# Patient Record
Sex: FEMALE | Race: WHITE | Employment: UNEMPLOYED | URBAN - METROPOLITAN AREA
[De-identification: names, ages, dates, MRNs, and addresses within clinical notes are randomized per-mention and may not be internally consistent; named-entity substitution may affect disease eponyms.]

---

## 2020-12-20 ENCOUNTER — APPOINTMENT (OUTPATIENT)
Dept: GENERAL RADIOLOGY | Age: 50
End: 2020-12-20
Payer: OTHER GOVERNMENT

## 2020-12-20 ENCOUNTER — HOSPITAL ENCOUNTER (EMERGENCY)
Age: 50
Discharge: HOME | End: 2020-12-20
Attending: EMERGENCY MEDICINE
Payer: OTHER GOVERNMENT

## 2020-12-20 VITALS
HEART RATE: 88 BPM | RESPIRATION RATE: 20 BRPM | BODY MASS INDEX: 28.12 KG/M2 | WEIGHT: 175 LBS | OXYGEN SATURATION: 98 % | SYSTOLIC BLOOD PRESSURE: 147 MMHG | DIASTOLIC BLOOD PRESSURE: 89 MMHG | HEIGHT: 66 IN | TEMPERATURE: 98.3 F

## 2020-12-20 LAB
ANION GAP SERPL CALCULATED.3IONS-SCNC: 9 MMOL/L (ref 3–16)
BASOPHILS ABSOLUTE: 0 K/UL (ref 0–0.2)
BASOPHILS RELATIVE PERCENT: 0.6 %
BUN BLDV-MCNC: 17 MG/DL (ref 7–20)
CALCIUM SERPL-MCNC: 10 MG/DL (ref 8.3–10.6)
CHLORIDE BLD-SCNC: 102 MMOL/L (ref 99–110)
CO2: 26 MMOL/L (ref 21–32)
CREAT SERPL-MCNC: 0.7 MG/DL (ref 0.6–1.1)
EOSINOPHILS ABSOLUTE: 0 K/UL (ref 0–0.6)
EOSINOPHILS RELATIVE PERCENT: 0.8 %
GFR AFRICAN AMERICAN: >60
GFR NON-AFRICAN AMERICAN: >60
GLUCOSE BLD-MCNC: 97 MG/DL (ref 70–99)
HCT VFR BLD CALC: 42.4 % (ref 36–48)
HEMOGLOBIN: 14.2 G/DL (ref 12–16)
LYMPHOCYTES ABSOLUTE: 1.6 K/UL (ref 1–5.1)
LYMPHOCYTES RELATIVE PERCENT: 28 %
MCH RBC QN AUTO: 33.5 PG (ref 26–34)
MCHC RBC AUTO-ENTMCNC: 33.4 G/DL (ref 31–36)
MCV RBC AUTO: 100.1 FL (ref 80–100)
MONOCYTES ABSOLUTE: 0.6 K/UL (ref 0–1.3)
MONOCYTES RELATIVE PERCENT: 9.6 %
NEUTROPHILS ABSOLUTE: 3.5 K/UL (ref 1.7–7.7)
NEUTROPHILS RELATIVE PERCENT: 61 %
PDW BLD-RTO: 13.6 % (ref 12.4–15.4)
PLATELET # BLD: 212 K/UL (ref 135–450)
PMV BLD AUTO: 7.1 FL (ref 5–10.5)
POTASSIUM REFLEX MAGNESIUM: 4.5 MMOL/L (ref 3.5–5.1)
RBC # BLD: 4.23 M/UL (ref 4–5.2)
SODIUM BLD-SCNC: 137 MMOL/L (ref 136–145)
WBC # BLD: 5.7 K/UL (ref 4–11)

## 2020-12-20 PROCEDURE — 80048 BASIC METABOLIC PNL TOTAL CA: CPT

## 2020-12-20 PROCEDURE — 71045 X-RAY EXAM CHEST 1 VIEW: CPT

## 2020-12-20 PROCEDURE — 85025 COMPLETE CBC W/AUTO DIFF WBC: CPT

## 2020-12-20 PROCEDURE — 36415 COLL VENOUS BLD VENIPUNCTURE: CPT

## 2020-12-20 PROCEDURE — 99283 EMERGENCY DEPT VISIT LOW MDM: CPT

## 2020-12-20 RX ORDER — BENZONATATE 100 MG/1
100 CAPSULE ORAL 3 TIMES DAILY PRN
Qty: 30 CAPSULE | Refills: 0 | Status: SHIPPED | OUTPATIENT
Start: 2020-12-20 | End: 2020-12-30

## 2020-12-20 ASSESSMENT — ENCOUNTER SYMPTOMS
COUGH: 1
NAUSEA: 0
SHORTNESS OF BREATH: 1
WHEEZING: 0
VOMITING: 0
ABDOMINAL DISTENTION: 0
ABDOMINAL PAIN: 0
DIARRHEA: 0

## 2020-12-20 NOTE — ED PROVIDER NOTES
ED Attending Attestation Note     Date of evaluation: 12/20/2020    This patient was seen by the resident, Dr. Toby Payne. I have seen and examined the patient, agree with the workup, evaluation, management and diagnosis. The care plan has been discussed. This is a 80-year-old female with a past medical history of right ear deafness as well as COPD that presents today for evaluation of generalized weakness as well as cough. Patient was diagnosed with COVID-19 on 12/3/2020. She states that she warranting for 14 days as well as was given prescriptions for doxycycline and steroids. She states that her symptoms improved slightly, but her main symptom is a nonproductive cough. She states that she has episodes where she feels warm but does not have any overt fevers. She states she continues to take in plenty of p.o. Denies chest pain. She denies abdominal pain or nausea/vomiting/diarrhea. She is able to ambulate. She states she has not been to work because of the overall weakness. On exam, she is nontoxic-appearing. She is not hypoxic on room air. Her bilateral TMs are visualized with no effusion or erythema. Her posterior pharynx is visualized without exudates or erythema. Her mucous membranes are moist.  She has a regular rate and rhythm, lung sounds are clear and equal bilaterally in all lung fields. Her abdomen soft, nontender, nondistended. She has no edema in the lower extremities, and no calf tenderness. Work-up in the emergency department consisted of labs and a chest x-ray. Patient has no leukocytosis or left shift. Chest x-ray, 1 view, as reviewed by me, shows viral pneumonia. At this point, the patient has no acute needs for hospitalization and can be safely discharged home. Be encouraged to continue hydration, Tylenol as needed for joint pain, and vitamin supplements. We will also discharge her home with a pulse oximeter to monitor her oxygen saturation.     Please see resident note for reassessments and final disposition.              Katia Nathan MD  12/20/20 [id number]

## 2020-12-20 NOTE — ED PROVIDER NOTES
[facility] RESIDENT NOTE       Date of evaluation: 12/20/2020    Chief Complaint     Positive For Covid-19 and Dizziness      History of Present Illness     Luiza Prakash is a 48 y.o. female who presents with continued cough and fatigue related to covid 19 positive test about 2 weeks ago. Patient was given steroids and doxycycline by outside facility for covid and felt better with steroids but after she finished course began to feel more fatigued. She continues to have a dry cough that is improving. She has mild diarrhea. Has dyspnea on exertion though pulse ox 97% after ambulation in ED. Denies any f/c, cp, n/v.    Review of Systems     Review of Systems   Constitutional: Positive for fatigue. Negative for chills and fever. HENT: Negative for congestion. Respiratory: Positive for cough and shortness of breath. Negative for wheezing. Cardiovascular: Negative for chest pain and palpitations. Gastrointestinal: Negative for abdominal distention, abdominal pain, diarrhea, nausea and vomiting. Genitourinary: Negative for dysuria. Neurological: Negative for dizziness, syncope, weakness, light-headedness and numbness. Past Medical, Surgical, Family, and Social History     She has no past medical history on file. She has no past surgical history on file. Her family history is not on file. She reports that she has been smoking. She has been smoking about 0.50 packs per day. She has never used smokeless tobacco. She reports that she does not drink alcohol or use drugs. Medications     Discharge Medication List as of 12/20/2020  3:05 PM          Allergies     She has No Known Allergies. Physical Exam     INITIAL VITALS: BP: (!) 155/92, Temp: 98.3 °F (36.8 °C), Pulse: 93, Resp: 20, SpO2: 98 %    Physical Exam  Constitutional:       General: She is not in acute distress. Appearance: She is well-developed. She is not diaphoretic. HENT:      Head: Normocephalic and atraumatic. Cardiovascular:      Rate and Rhythm: Normal rate and regular rhythm. Heart sounds: Normal heart sounds. No murmur. Pulmonary:      Effort: Pulmonary effort is normal. No respiratory distress. Breath sounds: Normal breath sounds. No wheezing. Abdominal:      General: Bowel sounds are normal. There is no distension. Palpations: Abdomen is soft. Tenderness: There is no abdominal tenderness. Skin:     General: Skin is warm and dry. Capillary Refill: Capillary refill takes less than 2 seconds. Findings: No erythema. Neurological:      Mental Status: She is alert and oriented to person, place, and time.    Psychiatric:         Behavior: Behavior normal.         Diagnostic Results       RADIOLOGY:  XR CHEST PORTABLE   Final Result      Mild bilateral ground glass opacities as described          LABS:   Results for orders placed or performed during the hospital encounter of 12/20/20   CBC Auto Differential   Result Value Ref Range    WBC 5.7 4.0 - 11.0 K/uL    RBC 4.23 4.00 - 5.20 M/uL    Hemoglobin 14.2 12.0 - 16.0 g/dL    Hematocrit 42.4 36.0 - 48.0 %    .1 (H) 80.0 - 100.0 fL    MCH 33.5 26.0 - 34.0 pg    MCHC 33.4 31.0 - 36.0 g/dL    RDW 13.6 12.4 - 15.4 %    Platelets 156 892 - 317 K/uL    MPV 7.1 5.0 - 10.5 fL    Neutrophils % 61.0 %    Lymphocytes % 28.0 %    Monocytes % 9.6 %    Eosinophils % 0.8 %    Basophils % 0.6 %    Neutrophils Absolute 3.5 1.7 - 7.7 K/uL    Lymphocytes Absolute 1.6 1.0 - 5.1 K/uL    Monocytes Absolute 0.6 0.0 - 1.3 K/uL    Eosinophils Absolute 0.0 0.0 - 0.6 K/uL    Basophils Absolute 0.0 0.0 - 0.2 K/uL   Basic Metabolic Panel w/ Reflex to MG   Result Value Ref Range    Sodium 137 136 - 145 mmol/L    Potassium reflex Magnesium 4.5 3.5 - 5.1 mmol/L    Chloride 102 99 - 110 mmol/L    CO2 26 21 - 32 mmol/L    Anion Gap 9 3 - 16    Glucose 97 70 - 99 mg/dL    BUN 17 7 - 20 mg/dL    CREATININE 0.7 0.6 - 1.1 mg/dL    GFR Non-African American >60 >60    GFR African American >60 >60    Calcium 10.0 8.3 - 10.6 mg/dL         RECENT VITALS:  BP: (!) 147/89, Temp: 98.3 °F (36.8 °C),Pulse: 88, Resp: 20, SpO2: 98 %     Procedures     n/a    ED Course     Nursing Notes, Past Medical Hx, Past Surgical Hx, Social Hx, Allergies, and FamilyHx were reviewed. The patient was giventhe following medications:  Orders Placed This Encounter   Medications    benzonatate (TESSALON PERLES) 100 MG capsule     Sig: Take 1 capsule by mouth 3 times daily as needed for Cough     Dispense:  30 capsule     Refill:  0       CONSULTS:  None    MEDICAL DECISION MAKING / ASSESSMENT / Shyam Sharmila is a 48 y.o. female with known covid positive test presents with continued fatigue and cough after 2 weeks. Likely from extended healing for covid pna. Will check CBC, BMP, and CXR to r/o any other abnormalities. Will discharge patient with instructions to take vit D, Vit C, and Zn. Will give tessalon pearls to help with cough as mucinex gives mild diarrhea. CXR with some ground-glass opacities, consistent with covid pna. Labs unremarkable. Patient medically stable for discharge. This patient was also evaluated by the attending physician. All care plans were discussed and agreed upon.     Clinical Impression     1. COVID-19        Disposition     PATIENT REFERRED TO:  Alysa Pimentel MD  [address] [address] [phone]    In 2 weeks  Hospial admission follow-up      DISCHARGE MEDICATIONS:  Discharge Medication List as of 12/20/2020  3:05 PM      START taking these medications    Details   benzonatate (TESSALON PERLES) 100 MG capsule Take 1 capsule by mouth 3 times daily as needed for Cough, Disp-30 capsule, R-[id number]             DISPOSITION Decision To Discharge 12/20/2020 02:43:49 PM       Suad Brown MD  Resident  12/20/20 [id number]

## 2020-12-21 ENCOUNTER — CARE COORDINATION (OUTPATIENT)
Dept: CARE COORDINATION | Age: 50
End: 2020-12-21

## 2020-12-21 NOTE — CARE COORDINATION
First phone call placed to reach patient for ED FU. Her phone message said that phone is restricted or unavailable. Plan  Make second phone call    Josselin Gonzalez.  Jessica El RN, BSN, [address] [place] [address] Primary Care  [phone]

## 2020-12-22 ENCOUNTER — CARE COORDINATION (OUTPATIENT)
Dept: CARE COORDINATION | Age: 50
End: 2020-12-22

## 2021-05-22 ENCOUNTER — APPOINTMENT (OUTPATIENT)
Dept: CT IMAGING | Age: 51
DRG: 194 | End: 2021-05-22
Payer: COMMERCIAL

## 2021-05-22 ENCOUNTER — APPOINTMENT (OUTPATIENT)
Dept: GENERAL RADIOLOGY | Age: 51
DRG: 194 | End: 2021-05-22
Payer: COMMERCIAL

## 2021-05-22 ENCOUNTER — HOSPITAL ENCOUNTER (INPATIENT)
Age: 51
LOS: 5 days | Discharge: HOME | DRG: 194 | End: 2021-05-27
Attending: STUDENT IN AN ORGANIZED HEALTH CARE EDUCATION/TRAINING PROGRAM | Admitting: INTERNAL MEDICINE
Payer: COMMERCIAL

## 2021-05-22 DIAGNOSIS — J18.9 PNEUMONIA DUE TO ORGANISM: Primary | ICD-10-CM

## 2021-05-22 PROBLEM — J96.01 ACUTE RESPIRATORY FAILURE WITH HYPOXIA (HCC): Status: ACTIVE | Noted: 2021-05-22

## 2021-05-22 LAB
ANION GAP SERPL CALCULATED.3IONS-SCNC: 9 MMOL/L (ref 3–16)
BASOPHILS ABSOLUTE: 0 K/UL (ref 0–0.2)
BASOPHILS RELATIVE PERCENT: 0.7 %
BUN BLDV-MCNC: 12 MG/DL (ref 7–20)
CALCIUM SERPL-MCNC: 9.5 MG/DL (ref 8.3–10.6)
CHLORIDE BLD-SCNC: 103 MMOL/L (ref 99–110)
CO2: 27 MMOL/L (ref 21–32)
CREAT SERPL-MCNC: 0.8 MG/DL (ref 0.6–1.1)
EOSINOPHILS ABSOLUTE: 0 K/UL (ref 0–0.6)
EOSINOPHILS RELATIVE PERCENT: 0.4 %
GFR AFRICAN AMERICAN: >60
GFR NON-AFRICAN AMERICAN: >60
GLUCOSE BLD-MCNC: 109 MG/DL (ref 70–99)
HCT VFR BLD CALC: 42.8 % (ref 36–48)
HEMOGLOBIN: 14.7 G/DL (ref 12–16)
LYMPHOCYTES ABSOLUTE: 0.9 K/UL (ref 1–5.1)
LYMPHOCYTES RELATIVE PERCENT: 20.3 %
MCH RBC QN AUTO: 33.7 PG (ref 26–34)
MCHC RBC AUTO-ENTMCNC: 34.3 G/DL (ref 31–36)
MCV RBC AUTO: 98.3 FL (ref 80–100)
MONOCYTES ABSOLUTE: 0.3 K/UL (ref 0–1.3)
MONOCYTES RELATIVE PERCENT: 7.8 %
NEUTROPHILS ABSOLUTE: 3.1 K/UL (ref 1.7–7.7)
NEUTROPHILS RELATIVE PERCENT: 70.8 %
PDW BLD-RTO: 13.3 % (ref 12.4–15.4)
PLATELET # BLD: 183 K/UL (ref 135–450)
PMV BLD AUTO: 6.8 FL (ref 5–10.5)
POTASSIUM REFLEX MAGNESIUM: 4 MMOL/L (ref 3.5–5.1)
PRO-BNP: 22 PG/ML (ref 0–124)
RBC # BLD: 4.36 M/UL (ref 4–5.2)
SODIUM BLD-SCNC: 139 MMOL/L (ref 136–145)
WBC # BLD: 4.3 K/UL (ref 4–11)

## 2021-05-22 PROCEDURE — U0005 INFEC AGEN DETEC AMPLI PROBE: HCPCS

## 2021-05-22 PROCEDURE — 6370000000 HC RX 637 (ALT 250 FOR IP): Performed by: STUDENT IN AN ORGANIZED HEALTH CARE EDUCATION/TRAINING PROGRAM

## 2021-05-22 PROCEDURE — 85025 COMPLETE CBC W/AUTO DIFF WBC: CPT

## 2021-05-22 PROCEDURE — 71260 CT THORAX DX C+: CPT

## 2021-05-22 PROCEDURE — 71046 X-RAY EXAM CHEST 2 VIEWS: CPT

## 2021-05-22 PROCEDURE — 99285 EMERGENCY DEPT VISIT HI MDM: CPT

## 2021-05-22 PROCEDURE — U0003 INFECTIOUS AGENT DETECTION BY NUCLEIC ACID (DNA OR RNA); SEVERE ACUTE RESPIRATORY SYNDROME CORONAVIRUS 2 (SARS-COV-2) (CORONAVIRUS DISEASE [COVID-19]), AMPLIFIED PROBE TECHNIQUE, MAKING USE OF HIGH THROUGHPUT TECHNOLOGIES AS DESCRIBED BY CMS-2020-01-R: HCPCS

## 2021-05-22 PROCEDURE — 2580000003 HC RX 258: Performed by: STUDENT IN AN ORGANIZED HEALTH CARE EDUCATION/TRAINING PROGRAM

## 2021-05-22 PROCEDURE — 94640 AIRWAY INHALATION TREATMENT: CPT

## 2021-05-22 PROCEDURE — 80048 BASIC METABOLIC PNL TOTAL CA: CPT

## 2021-05-22 PROCEDURE — 6360000004 HC RX CONTRAST MEDICATION: Performed by: STUDENT IN AN ORGANIZED HEALTH CARE EDUCATION/TRAINING PROGRAM

## 2021-05-22 PROCEDURE — 6360000002 HC RX W HCPCS: Performed by: STUDENT IN AN ORGANIZED HEALTH CARE EDUCATION/TRAINING PROGRAM

## 2021-05-22 PROCEDURE — 1200000000 HC SEMI PRIVATE

## 2021-05-22 PROCEDURE — 93005 ELECTROCARDIOGRAM TRACING: CPT | Performed by: STUDENT IN AN ORGANIZED HEALTH CARE EDUCATION/TRAINING PROGRAM

## 2021-05-22 PROCEDURE — 96365 THER/PROPH/DIAG IV INF INIT: CPT

## 2021-05-22 PROCEDURE — 94664 DEMO&/EVAL PT USE INHALER: CPT

## 2021-05-22 PROCEDURE — 83880 ASSAY OF NATRIURETIC PEPTIDE: CPT

## 2021-05-22 RX ORDER — ALBUTEROL SULFATE 2.5 MG/3ML
2.5 SOLUTION RESPIRATORY (INHALATION) ONCE
Status: DISCONTINUED | OUTPATIENT
Start: 2021-05-22 | End: 2021-05-23

## 2021-05-22 RX ORDER — IPRATROPIUM BROMIDE AND ALBUTEROL SULFATE 2.5; .5 MG/3ML; MG/3ML
1 SOLUTION RESPIRATORY (INHALATION) ONCE
Status: COMPLETED | OUTPATIENT
Start: 2021-05-22 | End: 2021-05-22

## 2021-05-22 RX ORDER — ALBUTEROL SULFATE 2.5 MG/3ML
2.5 SOLUTION RESPIRATORY (INHALATION) ONCE
Status: COMPLETED | OUTPATIENT
Start: 2021-05-22 | End: 2021-05-22

## 2021-05-22 RX ORDER — BENZONATATE 100 MG/1
100 CAPSULE ORAL 3 TIMES DAILY PRN
Qty: 30 CAPSULE | Refills: 0 | Status: SHIPPED | OUTPATIENT
Start: 2021-05-22 | End: 2021-05-27 | Stop reason: SDUPTHER

## 2021-05-22 RX ORDER — ALBUTEROL SULFATE 90 UG/1
2 AEROSOL, METERED RESPIRATORY (INHALATION) EVERY 6 HOURS PRN
Status: DISCONTINUED | OUTPATIENT
Start: 2021-05-22 | End: 2021-05-22

## 2021-05-22 RX ORDER — ALBUTEROL SULFATE 90 UG/1
2 AEROSOL, METERED RESPIRATORY (INHALATION) 4 TIMES DAILY PRN
Qty: 1 INHALER | Refills: 1 | Status: SHIPPED | OUTPATIENT
Start: 2021-05-22 | End: 2021-05-27 | Stop reason: SDUPTHER

## 2021-05-22 RX ORDER — PREDNISONE 20 MG/1
40 TABLET ORAL ONCE
Status: COMPLETED | OUTPATIENT
Start: 2021-05-22 | End: 2021-05-22

## 2021-05-22 RX ADMIN — ALBUTEROL SULFATE 2.5 MG: 2.5 SOLUTION RESPIRATORY (INHALATION) at 22:56

## 2021-05-22 RX ADMIN — IPRATROPIUM BROMIDE AND ALBUTEROL SULFATE 1 AMPULE: .5; 2.5 SOLUTION RESPIRATORY (INHALATION) at 18:31

## 2021-05-22 RX ADMIN — IPRATROPIUM BROMIDE AND ALBUTEROL SULFATE 1 AMPULE: .5; 2.5 SOLUTION RESPIRATORY (INHALATION) at 19:35

## 2021-05-22 RX ADMIN — IOPAMIDOL 80 ML: 755 INJECTION, SOLUTION INTRAVENOUS at 22:17

## 2021-05-22 RX ADMIN — AZITHROMYCIN MONOHYDRATE 500 MG: 500 INJECTION, POWDER, LYOPHILIZED, FOR SOLUTION INTRAVENOUS at 23:57

## 2021-05-22 RX ADMIN — DEXTROSE MONOHYDRATE 1000 MG: 5 INJECTION INTRAVENOUS at 23:13

## 2021-05-22 RX ADMIN — PREDNISONE 40 MG: 20 TABLET ORAL at 19:30

## 2021-05-22 ASSESSMENT — PAIN DESCRIPTION - PAIN TYPE: TYPE: ACUTE PAIN

## 2021-05-22 ASSESSMENT — ENCOUNTER SYMPTOMS
RHINORRHEA: 0
SHORTNESS OF BREATH: 1
NAUSEA: 0
BLOOD IN STOOL: 0
VOMITING: 0
ABDOMINAL PAIN: 0
SORE THROAT: 0
CHEST TIGHTNESS: 0
CONSTIPATION: 0
COUGH: 1
DIARRHEA: 0

## 2021-05-22 ASSESSMENT — PAIN SCALES - GENERAL: PAINLEVEL_OUTOF10: 4

## 2021-05-22 ASSESSMENT — PAIN DESCRIPTION - LOCATION: LOCATION: CHEST

## 2021-05-22 NOTE — ED PROVIDER NOTES
[facility] RESIDENT NOTE       Date of evaluation: 5/22/2021    Chief Complaint     Cough (since Wednesday, missed appt for OP CXR on Thursday, thinks she has PNA)      History of Present Illness     Gianfranco Cummings is a 46 y.o. female with cough. The patient reports she has had a cough since 3 days prior to presentation which has been intermittently productive, without hemoptysis. She has been coughing so frequently she is now having bilateral lower rib pain and is having trouble sleeping. She has had no ill contacts. She has no orthopnea, weight gain, lower extremity edema. She does have chest pain in the context of coughing, but no exertional pain, diaphoresis, dyspnea on exertion. She states that she is concerned she has pneumonia she had similar symptoms in the fall and was treated for pneumonia with oral antibiotics. She smokes tobacco daily, does not use illicit substances. Review of Systems     Review of Systems   Constitutional: Positive for fatigue and fever. Negative for chills and unexpected weight change. HENT: Negative for rhinorrhea and sore throat. Eyes: Negative for visual disturbance. Respiratory: Positive for cough and shortness of breath. Negative for chest tightness. Cardiovascular: Negative for chest pain and leg swelling. Gastrointestinal: Negative for abdominal pain, blood in stool, constipation, diarrhea, nausea and vomiting. Genitourinary: Negative for dysuria. Skin: Negative for rash. Neurological: Negative for dizziness, weakness and headaches. Physical Exam     INITIAL VITALS: BP: (!) 156/92, Temp: 99 °F (37.2 °C), Pulse: 115, Resp: 15, SpO2: 94 %   General: Comfortable appearing woman, coughing intermittently  HEENT:  Normocephalic, atraumatic. PERRL. Conjunctivae pink, moist. No scleral icterus. No nasal discharge. Moist mucus membranes. Neck:  Supple. Trachea midline.  JVP estimated at 5 cmH2O.  Pulmonary:   Normal respiratory effort. Intermittent bibasilar wheeze, no rhonchi. Cardiac: Regular rate and rhythm. No murmurs, gallops or rubs. Radial pulses 2+ bilaterally. Capillary refill < 2 seconds in fingers. Abdomen:  Soft, not tender, not distended. Bowel sounds present. Musculoskeletal:  Normal bulk and tone for age. Skin:  Warm, dry. No rashes, ecchymosis or cyanosis. Neuro: Alert and oriented x 4. Cranial nerves grossly intact. Moving all extremities equally. Gait normal.  Extremities:  No peripheral edema. Psych: Euthymic affect. Normal rate and rhythm of speech with full prosody. Linear thought process. ED Course     Nursing Notes, Past Medical Hx, Past Surgical Hx, Social Hx, Allergies, and Family Hx were reviewed. The patient was given the following medications:  Orders Placed This Encounter   Medications    ipratropium-albuterol (DUONEB) nebulizer solution 1 ampule    benzonatate (TESSALON PERLES) 100 MG capsule     Sig: Take 1 capsule by mouth 3 times daily as needed for Cough     Dispense:  30 capsule     Refill:  0    albuterol sulfate HFA (VENTOLIN HFA) 108 (90 Base) MCG/ACT inhaler     Sig: Inhale 2 puffs into the lungs 4 times daily as needed for Wheezing     Dispense:  1 Inhaler     Refill:  1       CONSULTS:  None    MEDICAL DECISION MAKING / ASSESSMENT / Cornelius Yosef is a 46 y.o. female who presented to the emergency department with Cough (since Wednesday, missed appt for OP CXR on Thursday, thinks she has PNA)   with a history and presentation as described above in HPI. The patient was evaluated by myself and the ED attending physician. All management and disposition plans were discussed and agreed upon. ED Course as of May 22 1851   Sat May 22, 2021   0405 The patient presents with likely bronchitis although there is some concern for pneumonia given her fever at home. She does not have obviously focal exam findings initially.   She does smoke, putting her at risk for lung disease in general.  I have a low suspicion for heart failure based on her initial evaluation including the absence of JVP elevation or significant lower extremity edema. She was initially tachycardic in triage, which is likely in the context of her frequent coughing fits. [CS]   1596 Labs are notable for a negative BNP, clear chest x-ray, no significant metabolic abnormalities. On reassessment after DuoNeb's the patient felt significant improved. She was discharged with Tessalon as well as albuterol for symptom control. I recommended primary care follow-up, we discussed return precaution including severe worsening of her symptoms, trouble breathing, presyncope, hemoptysis, or other new worrisome symptoms. [CS]      ED Course User Index  [CS] Dustin Burks MD         This patient was also evaluated by the attending physician. All care plans were discussed and agreed upon. Clinical Impression     1. Bronchitis        Disposition     At this time the patient has been deemed safe for discharge. My customary discharge instructions including strict return precautions for worsening or new symptoms have been communicated. The patient was able to have all questions answered and was in agreement with the stated plan.       PATIENT REFERRED TO:  José Miguel Marrero MD  [address] [address]  [phone]    Schedule an appointment as soon as possible for a visit   As needed, If symptoms worsen    [facility] Emergency Department  [address] [place] [address]  Go to   As needed, If symptoms worsen      DISCHARGE MEDICATIONS:  New Prescriptions    ALBUTEROL SULFATE HFA (VENTOLIN HFA) 108 (90 BASE) MCG/ACT INHALER    Inhale 2 puffs into the lungs 4 times daily as needed for Wheezing    BENZONATATE (TESSALON PERLES) 100 MG CAPSULE    Take 1 capsule by mouth 3 times daily as needed for Cough       DISPOSITION        Diagnostic Results     EKG   Interpreted in conjunction with emergency department physician Sandra Don MD  Normal sinus rhythm with a rate of 97 bpm, sinus arrhythmia is present. Normal axis and intervals. There are no ST deflections. RADIOLOGY:  XR CHEST (2 VW)   Final Result   1. No acute disease. LABS:   Results for orders placed or performed during the hospital encounter of 05/22/21   CBC Auto Differential   Result Value Ref Range    WBC 4.3 4.0 - 11.0 K/uL    RBC 4.36 4.00 - 5.20 M/uL    Hemoglobin 14.7 12.0 - 16.0 g/dL    Hematocrit 42.8 36.0 - 48.0 %    MCV 98.3 80.0 - 100.0 fL    MCH 33.7 26.0 - 34.0 pg    MCHC 34.3 31.0 - 36.0 g/dL    RDW 13.3 12.4 - 15.4 %    Platelets 704 589 - 197 K/uL    MPV 6.8 5.0 - 10.5 fL    Neutrophils % 70.8 %    Lymphocytes % 20.3 %    Monocytes % 7.8 %    Eosinophils % 0.4 %    Basophils % 0.7 %    Neutrophils Absolute 3.1 1.7 - 7.7 K/uL    Lymphocytes Absolute 0.9 (L) 1.0 - 5.1 K/uL    Monocytes Absolute 0.3 0.0 - 1.3 K/uL    Eosinophils Absolute 0.0 0.0 - 0.6 K/uL    Basophils Absolute 0.0 0.0 - 0.2 K/uL   Brain Natriuretic Peptide   Result Value Ref Range    Pro-BNP 22 0 - 124 pg/mL   Basic Metabolic Panel w/ Reflex to MG   Result Value Ref Range    Sodium 139 136 - 145 mmol/L    Potassium reflex Magnesium 4.0 3.5 - 5.1 mmol/L    Chloride 103 99 - 110 mmol/L    CO2 27 21 - 32 mmol/L    Anion Gap 9 3 - 16    Glucose 109 (H) 70 - 99 mg/dL    BUN 12 7 - 20 mg/dL    CREATININE 0.8 0.6 - 1.1 mg/dL    GFR Non-African American >60 >60    GFR African American >60 >60    Calcium 9.5 8.3 - 10.6 mg/dL       ED BEDSIDE ULTRASOUND:  None     RECENT VITALS:  BP: (!) 150/94, Temp: 99 °F (37.2 °C), Pulse: 99, Resp: 15, SpO2: 94 %     Procedures     None     Past Medical, Surgical, Family, and Social History     She has no past medical history on file. She has no past surgical history on file. Her family history is not on file. She reports that she has been smoking.  She has been smoking about 0.50 packs per day. She has never used smokeless tobacco. She reports that she does not drink alcohol and does not use drugs. Medications     Previous Medications    No medications on file       Allergies     She has No Known Allergies.         Pete Gray MD  Resident  05/22/21 6092

## 2021-05-22 NOTE — ED NOTES
PIV placed using aseptic technique per hospital policy. Blood collected and sent to lab.      Shana Mcclellan RN  05/22/21 7212

## 2021-05-22 NOTE — ED PROVIDER NOTES
ED Note  Addendum    Isabell Canas is a 46 y.o. female patient who presented to the emergency department. This patient was initially seen by Dr. Airam Wilhelm, an off-going provider. Please see that provider's note for details regarding the initial history, physical exam and ED course. In brief, this is a 46 y.o. female who presented with cough and SOB. Care of this patient was signed out to me. At the time of turnover the following steps in the patient's evaluation were pending: Breathing treatments, reassessment and disposition. On my assessment the patient was awake, alert and in no acute distress. Patient was satting in the high 80s to low 90s on 2 L of oxygen, thus she was increased to 3 L of oxygen and improved to the mid 90s. Patient was treated with additional breathing treatments as well as 40 mg of prednisone. Given that the patient continued to require oxygen without a previously known history of COPD or asthma, CTPA was ordered to evaluate for pulmonary embolism as well as an occult pneumonia. Covid test was sent. Patient continued to require oxygen despite breathing treatment. CTPA showed no evidence of pulmonary embolism but did show multifocal pneumonia. Patient was treated with IV ceftriaxone and azithromycin. Case was discussed with medicine who accepted patient for admission for further evaluation and management. At this time the patient has been admitted to medicine for further evaluation and management. All results were discussed with the patient at length, concerns were addressed and all questions answered. Risks, benefits, and alternatives were discussed with the patient, and she agrees on this disposition. The patient will continue to be monitored here in the emergency department until which time she is moved to her new treatment location.      Summary of Treatment in ED:  Medications   albuterol (PROVENTIL) nebulizer solution 2.5 mg ( Nebulization Canceled Entry 5/22/21 1930) cefTRIAXone (ROCEPHIN) 1000 mg IVPB in 50 mL D5W minibag (1,000 mg Intravenous New Bag 5/22/21 2313)   azithromycin (ZITHROMAX) 500 mg in dextrose 5 % 250 mL IVPB (has no administration in time range)   ipratropium-albuterol (DUONEB) nebulizer solution 1 ampule (1 ampule Inhalation Given 5/22/21 1831)   predniSONE (DELTASONE) tablet 40 mg (40 mg Oral Given 5/22/21 1930)   ipratropium-albuterol (DUONEB) nebulizer solution 1 ampule (1 ampule Inhalation Given 5/22/21 1935)   iopamidol (ISOVUE-370) 76 % injection 80 mL (80 mLs Intravenous Given 5/22/21 2217)   albuterol (PROVENTIL) nebulizer solution 2.5 mg (2.5 mg Nebulization Given 5/22/21 2256)       Impression     1. Pneumonia due to organism         Plan   Admit to medicine     1. The patient is to be admitted in stable condition. 2. Workup, treatment and diagnosis were discussed with the patient and/or family members; the patient agrees to the plan and all questions were addressed and answered.     Girma Sanabria MD, MD, [address] [place] [address] MD  Resident  05/22/21 7852

## 2021-05-22 NOTE — ED NOTES
Patient complains of cough since Wednesday. Hx of Covid-19. Placed on 2L of oxygen for 88% oxygen saturation.      Salvador Wolf RN  05/22/21 4540

## 2021-05-23 LAB
ANION GAP SERPL CALCULATED.3IONS-SCNC: 9 MMOL/L (ref 3–16)
BUN BLDV-MCNC: 14 MG/DL (ref 7–20)
CALCIUM SERPL-MCNC: 9.5 MG/DL (ref 8.3–10.6)
CHLORIDE BLD-SCNC: 101 MMOL/L (ref 99–110)
CO2: 28 MMOL/L (ref 21–32)
CREAT SERPL-MCNC: 0.8 MG/DL (ref 0.6–1.1)
EKG ATRIAL RATE: 97 BPM
EKG DIAGNOSIS: NORMAL
EKG P AXIS: 64 DEGREES
EKG P-R INTERVAL: 134 MS
EKG Q-T INTERVAL: 322 MS
EKG QRS DURATION: 62 MS
EKG QTC CALCULATION (BAZETT): 408 MS
EKG R AXIS: 58 DEGREES
EKG T AXIS: 63 DEGREES
EKG VENTRICULAR RATE: 97 BPM
GFR AFRICAN AMERICAN: >60
GFR NON-AFRICAN AMERICAN: >60
GLUCOSE BLD-MCNC: 109 MG/DL (ref 70–99)
GLUCOSE BLD-MCNC: 110 MG/DL (ref 70–99)
GLUCOSE BLD-MCNC: 167 MG/DL (ref 70–99)
MAGNESIUM: 2.1 MG/DL (ref 1.8–2.4)
PERFORMED ON: ABNORMAL
PERFORMED ON: ABNORMAL
POTASSIUM SERPL-SCNC: 4.5 MMOL/L (ref 3.5–5.1)
PROCALCITONIN: 0.1 NG/ML (ref 0–0.15)
RAPID INFLUENZA  B AGN: NEGATIVE
RAPID INFLUENZA A AGN: NEGATIVE
RSV SOURCE: NORMAL
SARS-COV-2, PCR: NOT DETECTED
SODIUM BLD-SCNC: 138 MMOL/L (ref 136–145)

## 2021-05-23 PROCEDURE — 94664 DEMO&/EVAL PT USE INHALER: CPT

## 2021-05-23 PROCEDURE — 6370000000 HC RX 637 (ALT 250 FOR IP): Performed by: INTERNAL MEDICINE

## 2021-05-23 PROCEDURE — 2580000003 HC RX 258: Performed by: STUDENT IN AN ORGANIZED HEALTH CARE EDUCATION/TRAINING PROGRAM

## 2021-05-23 PROCEDURE — 2700000000 HC OXYGEN THERAPY PER DAY

## 2021-05-23 PROCEDURE — 94761 N-INVAS EAR/PLS OXIMETRY MLT: CPT

## 2021-05-23 PROCEDURE — 6370000000 HC RX 637 (ALT 250 FOR IP): Performed by: STUDENT IN AN ORGANIZED HEALTH CARE EDUCATION/TRAINING PROGRAM

## 2021-05-23 PROCEDURE — 2060000000 HC ICU INTERMEDIATE R&B

## 2021-05-23 PROCEDURE — 80048 BASIC METABOLIC PNL TOTAL CA: CPT

## 2021-05-23 PROCEDURE — 94640 AIRWAY INHALATION TREATMENT: CPT

## 2021-05-23 PROCEDURE — 87449 NOS EACH ORGANISM AG IA: CPT

## 2021-05-23 PROCEDURE — 87804 INFLUENZA ASSAY W/OPTIC: CPT

## 2021-05-23 PROCEDURE — 94669 MECHANICAL CHEST WALL OSCILL: CPT

## 2021-05-23 PROCEDURE — 83735 ASSAY OF MAGNESIUM: CPT

## 2021-05-23 PROCEDURE — 84145 PROCALCITONIN (PCT): CPT

## 2021-05-23 PROCEDURE — 36415 COLL VENOUS BLD VENIPUNCTURE: CPT

## 2021-05-23 PROCEDURE — 6360000002 HC RX W HCPCS: Performed by: STUDENT IN AN ORGANIZED HEALTH CARE EDUCATION/TRAINING PROGRAM

## 2021-05-23 RX ORDER — ACETAMINOPHEN 650 MG/1
650 SUPPOSITORY RECTAL EVERY 6 HOURS PRN
Status: DISCONTINUED | OUTPATIENT
Start: 2021-05-23 | End: 2021-05-27 | Stop reason: HOSPADM

## 2021-05-23 RX ORDER — ALBUTEROL SULFATE 2.5 MG/3ML
2.5 SOLUTION RESPIRATORY (INHALATION) EVERY 4 HOURS PRN
Status: DISCONTINUED | OUTPATIENT
Start: 2021-05-23 | End: 2021-05-27 | Stop reason: HOSPADM

## 2021-05-23 RX ORDER — POLYETHYLENE GLYCOL 3350 17 G/17G
17 POWDER, FOR SOLUTION ORAL DAILY PRN
Status: DISCONTINUED | OUTPATIENT
Start: 2021-05-23 | End: 2021-05-27 | Stop reason: HOSPADM

## 2021-05-23 RX ORDER — SODIUM CHLORIDE 9 MG/ML
25 INJECTION, SOLUTION INTRAVENOUS PRN
Status: DISCONTINUED | OUTPATIENT
Start: 2021-05-23 | End: 2021-05-27 | Stop reason: HOSPADM

## 2021-05-23 RX ORDER — ONDANSETRON 2 MG/ML
4 INJECTION INTRAMUSCULAR; INTRAVENOUS EVERY 6 HOURS PRN
Status: DISCONTINUED | OUTPATIENT
Start: 2021-05-23 | End: 2021-05-27 | Stop reason: HOSPADM

## 2021-05-23 RX ORDER — FAMOTIDINE 20 MG/1
20 TABLET, FILM COATED ORAL 2 TIMES DAILY
Status: DISCONTINUED | OUTPATIENT
Start: 2021-05-23 | End: 2021-05-27 | Stop reason: HOSPADM

## 2021-05-23 RX ORDER — PREDNISONE 20 MG/1
40 TABLET ORAL DAILY
Status: COMPLETED | OUTPATIENT
Start: 2021-05-23 | End: 2021-05-26

## 2021-05-23 RX ORDER — PROMETHAZINE HYDROCHLORIDE 25 MG/1
12.5 TABLET ORAL EVERY 6 HOURS PRN
Status: DISCONTINUED | OUTPATIENT
Start: 2021-05-23 | End: 2021-05-27 | Stop reason: HOSPADM

## 2021-05-23 RX ORDER — IPRATROPIUM BROMIDE AND ALBUTEROL SULFATE 2.5; .5 MG/3ML; MG/3ML
1 SOLUTION RESPIRATORY (INHALATION) EVERY 4 HOURS PRN
Status: DISCONTINUED | OUTPATIENT
Start: 2021-05-23 | End: 2021-05-23

## 2021-05-23 RX ORDER — NICOTINE 21 MG/24HR
1 PATCH, TRANSDERMAL 24 HOURS TRANSDERMAL DAILY
Status: DISCONTINUED | OUTPATIENT
Start: 2021-05-23 | End: 2021-05-27 | Stop reason: HOSPADM

## 2021-05-23 RX ORDER — ACETAMINOPHEN 325 MG/1
650 TABLET ORAL EVERY 6 HOURS PRN
Status: DISCONTINUED | OUTPATIENT
Start: 2021-05-23 | End: 2021-05-27 | Stop reason: HOSPADM

## 2021-05-23 RX ORDER — AZITHROMYCIN 250 MG/1
250 TABLET, FILM COATED ORAL DAILY
Status: DISCONTINUED | OUTPATIENT
Start: 2021-05-23 | End: 2021-05-24

## 2021-05-23 RX ORDER — SODIUM CHLORIDE 0.9 % (FLUSH) 0.9 %
5-40 SYRINGE (ML) INJECTION PRN
Status: DISCONTINUED | OUTPATIENT
Start: 2021-05-23 | End: 2021-05-27 | Stop reason: HOSPADM

## 2021-05-23 RX ORDER — IPRATROPIUM BROMIDE AND ALBUTEROL SULFATE 2.5; .5 MG/3ML; MG/3ML
1 SOLUTION RESPIRATORY (INHALATION)
Status: DISCONTINUED | OUTPATIENT
Start: 2021-05-23 | End: 2021-05-23

## 2021-05-23 RX ORDER — ALBUTEROL SULFATE 2.5 MG/3ML
2.5 SOLUTION RESPIRATORY (INHALATION)
Status: DISCONTINUED | OUTPATIENT
Start: 2021-05-23 | End: 2021-05-23

## 2021-05-23 RX ORDER — SODIUM CHLORIDE 0.9 % (FLUSH) 0.9 %
5-40 SYRINGE (ML) INJECTION EVERY 12 HOURS SCHEDULED
Status: DISCONTINUED | OUTPATIENT
Start: 2021-05-23 | End: 2021-05-27 | Stop reason: HOSPADM

## 2021-05-23 RX ADMIN — PREDNISONE 40 MG: 20 TABLET ORAL at 08:50

## 2021-05-23 RX ADMIN — FAMOTIDINE 20 MG: 20 TABLET ORAL at 08:50

## 2021-05-23 RX ADMIN — CEFTRIAXONE 1000 MG: 1 INJECTION, POWDER, FOR SOLUTION INTRAMUSCULAR; INTRAVENOUS at 20:50

## 2021-05-23 RX ADMIN — FAMOTIDINE 20 MG: 20 TABLET ORAL at 20:50

## 2021-05-23 RX ADMIN — Medication 10 ML: at 21:01

## 2021-05-23 RX ADMIN — IPRATROPIUM BROMIDE AND ALBUTEROL 1 PUFF: 20; 100 SPRAY, METERED RESPIRATORY (INHALATION) at 11:54

## 2021-05-23 RX ADMIN — IPRATROPIUM BROMIDE AND ALBUTEROL SULFATE 1 AMPULE: .5; 3 SOLUTION RESPIRATORY (INHALATION) at 08:05

## 2021-05-23 RX ADMIN — AZITHROMYCIN MONOHYDRATE 250 MG: 250 TABLET ORAL at 08:50

## 2021-05-23 RX ADMIN — IPRATROPIUM BROMIDE AND ALBUTEROL 1 PUFF: 20; 100 SPRAY, METERED RESPIRATORY (INHALATION) at 15:37

## 2021-05-23 RX ADMIN — IPRATROPIUM BROMIDE AND ALBUTEROL 1 PUFF: 20; 100 SPRAY, METERED RESPIRATORY (INHALATION) at 20:32

## 2021-05-23 RX ADMIN — ACETAMINOPHEN 650 MG: 325 TABLET ORAL at 16:44

## 2021-05-23 RX ADMIN — IPRATROPIUM BROMIDE AND ALBUTEROL 1 PUFF: 20; 100 SPRAY, METERED RESPIRATORY (INHALATION) at 16:39

## 2021-05-23 ASSESSMENT — ENCOUNTER SYMPTOMS
PHOTOPHOBIA: 0
COUGH: 1
STRIDOR: 0
ABDOMINAL DISTENTION: 0
CHEST TIGHTNESS: 1
TROUBLE SWALLOWING: 0
ABDOMINAL PAIN: 0
VOICE CHANGE: 0
APNEA: 0
CHOKING: 0
SHORTNESS OF BREATH: 1
CONSTIPATION: 0
NAUSEA: 0
DIARRHEA: 0
WHEEZING: 1
BACK PAIN: 0
COLOR CHANGE: 0
SINUS PAIN: 0
EYE PAIN: 0
VOMITING: 0

## 2021-05-23 ASSESSMENT — VISUAL ACUITY: OU: 1

## 2021-05-23 ASSESSMENT — PAIN SCALES - GENERAL
PAINLEVEL_OUTOF10: 0
PAINLEVEL_OUTOF10: 4
PAINLEVEL_OUTOF10: 0

## 2021-05-23 NOTE — PROGRESS NOTES
RESPIRATORY THERAPY ASSESSMENT    Name:  Hemant Record Number:  [id number]  Age: 46 y.o. Gender: female  : 1970  Today's Date:  2021  Room:  [address] [place] [address]-    Assessment     Is the patient being admitted for a COPD or Asthma exacerbation? No   (If yes the patient will be seen every 4 hours for the first 24 hours and then reassessed)    Patient Admission Diagnosis      Allergies           Pulmonary History: Current Smoker  Home Oxygen Therapy:  room air  Home Respiratory Therapy:Albuterol   Current Respiratory Therapy:  DuoNebs Q4wa & HHN Albuteral prn  Treatment Type: HHN  Medications: Albuterol/Ipratropium    Respiratory Severity Index(RSI)   Patients with orders for inhalation medications, oxygen, or any therapeutic treatment modality will be placed on Respiratory Protocol. They will be assessed with the first treatment and at least every 72 hours thereafter. The following severity scale will be used to determine frequency of treatment intervention. Smoking History: Pulmonary Disease or Smoking History, Greater than 15 pack year = 2    Social History  Social History     Tobacco Use    Smoking status: Current Every Day Smoker     Packs/day: 0.50    Smokeless tobacco: Never Used   Vaping Use    Vaping Use: Never used   Substance Use Topics    Alcohol use: No    Drug use: No       Recent Surgical History: None = 0  Past Surgical History  History reviewed. No pertinent surgical history.     Level of Consciousness: Alert, Oriented, and Cooperative = 0    Level of Activity: Walking with assistance = 1    Respiratory Pattern: Regular Pattern; RR 8-20 = 0    Breath Sounds: Absent bilaterally and/or with wheezes = 3    Sputum   ,  ,    Cough: Strong, productive = 1    Vital Signs   /81   Pulse 108   Temp 97.3 °F (36.3 °C) (Oral)   Resp 20   Ht 5' 6\" (1.676 m)   Wt 190 lb (86.2 kg)   SpO2 96%   BMI 30.67 kg/m²   SPO2 (COPD values may differ): 86-87% on room air or greater than 92% on FiO2 35- 50% = 3    Peak Flow (asthma only): not applicable = 0    RSI: 90-48 = Q4WA (every four hours while awake) and Q4hrs PRN        Plan       Goals: medication delivery, mobilize retained secretions, volume expansion and improve oxygenation    Patient/caregiver was educated on the proper method of use for Respiratory Care Devices:  Yes      Level of patient/caregiver understanding able to:   ? Verbalize understanding   ? Demonstrate understanding       ? Teach back        ? Needs reinforcement       ? No available caregiver               ? Other:     Response to education:  Good     Is patient being placed on Home Treatment Regimen? No     Does the patient have everything they need prior to discharge? NA     Comments: Pt Admitted Pt admitted in ED with SOB    Plan of Care: Continue Duoneb Q4wa    Electronically signed by Duke Hall RCP on 5/23/2021 at 2:27 AM    Respiratory Protocol Guidelines     1. Assessment and treatment by Respiratory Therapy will be initiated for medication and therapeutic interventions upon initiation of aerosolized medication. 2. Physician will be contacted for respiratory rate (RR) greater than 35 breaths per minute. Therapy will be held for heart rate (HR) greater than 140 beats per minute, pending direction from physician. 3. Bronchodilators will be administered via Metered Dose Inhaler (MDI) with spacer when the following criteria are met:  a. Alert and cooperative     b. HR < 140 bpm  c. RR < 30 bpm                d. Can demonstrate a 2-3 second inspiratory hold  4. Bronchodilators will be administered via Hand Held Nebulizer [facility]) to patients when ANY of the following criteria are met  a. Incognizant or uncooperative          b. Patients treated with HHN at Home        c. Unable to demonstrate proper use of MDI with spacer     d. RR > 30 bpm   5.  Bronchodilators will be delivered via Metered Dose Inhaler (MDI), HHN, Aerogen to intubated patients on mechanical ventilation. 6. Inhalation medication orders will be delivered and/or substituted as outlined below. Aerosolized Medications Ordering and Administration Guidelines:    1. All Medications will be ordered by a physician, and their frequency and/or modality will be adjusted as defined by the patients Respiratory Severity Index (RSI) score. 2. If the patient does not have documented COPD, consider discontinuing anticholinergics when RSI is less than 9.  3. If the bronchospasm worsens (increased RSI), then the bronchodilator frequency can be increased to a maximum of every 4 hours. If greater than every 4 hours is required, the physician will be contacted. 4. If the bronchospasm improves, the frequency of the bronchodilator can be decreased, based on the patient's RSI, but not less than home treatment regimen frequency. 5. Bronchodilator(s) will be discontinued if patient has a RSI less than 9 and has received no scheduled or as needed treatment for 72  Hrs. Patients Ordered on a Mucolytic Agent:    1. Must always be administered with a bronchodilator. 2. Discontinue if patient experiences worsened bronchospasm, or secretions have lessened to the point that the patient is able to clear them with a cough. Anti-inflammatory and Combination Medications:    1. If the patient lacks prior history of lung disease, is not using inhaled anti-inflammatory medication at home, and lacks wheezing by examination or by history for at least 24 hours, contact physician for possible discontinuation.

## 2021-05-23 NOTE — PROGRESS NOTES
Internal Medicine Progress Note  Patient Name: Sergio De Santiago   Patient : 1970   Date: 2021   Admit Date: 2021     CC: Cough    Interval history: No overnight events. Patient has been hypertensive (/85). SPO2 94% on 4L. No leukocytosis (WBC 4.3). She continues to have some dyspnea with cough, although states that this is improved somewhat compared to admission. Admits urinary incontinence associated with coughing. No other acute complaints at this time. Review of Systems   Constitutional: Negative for fatigue and fever. HENT: Negative for ear pain and sinus pain. Eyes: Negative for pain. Respiratory: Positive for cough and shortness of breath. Cardiovascular: Negative for chest pain. Gastrointestinal: Negative for abdominal pain, constipation and diarrhea. Endocrine: Negative for polyuria. Genitourinary: Negative for flank pain and pelvic pain. Incontinence with coughing   Musculoskeletal: Negative for back pain. Skin: Negative for color change. Neurological: Negative for dizziness and headaches. Psychiatric/Behavioral: Negative for agitation, behavioral problems and confusion. Physical Exam:  Patient Vitals for the past 8 hrs:   BP Temp Temp src Pulse Resp SpO2   21 1245 (!) 151/85 97.7 °F (36.5 °C) Oral 84 18 94 %   21 1155 -- -- -- -- -- 97 %   21 0843 (!) 127/93 97.1 °F (36.2 °C) Oral 81 18 97 %   21 0805 -- -- -- -- -- 94 %     Physical Exam  Constitutional:       General: She is awake. She is not in acute distress. Appearance: Normal appearance. HENT:      Head: Normocephalic and atraumatic. Nose: Nose normal.   Eyes:      General: Vision grossly intact. Gaze aligned appropriately. Right eye: No discharge. Left eye: No discharge. Extraocular Movements: Extraocular movements intact.       Conjunctiva/sclera: Conjunctivae normal.   Cardiovascular:      Rate and Rhythm: Normal rate and Radiology:  CT CHEST PULMONARY EMBOLISM W CONTRAST   Final Result      1. No evidence of pulmonary thromboembolism. No acute vascular abnormality. 2. Patchy areas of branching micronodular opacities bilaterally involving all lobes suggesting bronchiolitis versus early bilateral multifocal bronchopneumonia. XR CHEST (2 VW)   Final Result   1. No acute disease. Assessment and Plan:    51F w/ PMH smoking, Covid-positive test (asymptomatic) who presented 05/22 w/ dyspnea and fever. 1. CAP  - CXR (05/22/21): No acute disease.  - CT-chest (05/22/21): Patchy micronodular opacities c/w bronchiolitis vs. bronchopneumonia. - WBC 4.3, procal 0.10 at admission.  - Plan: CTX 1 g QD. Azithromycin 250 mg QD. 2. COPD exacerbation  - She has a 20 pack-year smoking history.  - No home oxygen requirement. - Plan: Prednisone 40 mg QD. Supplemental oxygen as needed. Combivent (KATHY/EUGENIO) q4h.     3. GERD  - Pepcid 20 mg QD. 4. Smoking  - Nicotine patch.  - Encourage cessation. 5. DVT PPX  - SCDs.  - Encourage ambulation. 6. Diet  - General.    7. Disposition  - Except d/c to home when O2 requirement improved. Will discuss with attending physician MD Adriane Kebede DO, PhD  Internal Medicine Resident (PGY-2)  [facility] [place]  [address] [address]

## 2021-05-23 NOTE — H&P
Internal Medicine  PGY2  History & Physical      CC   Chief Complaint   Patient presents with    Cough     since Wednesday, missed appt for OP CXR on Thursday, thinks she has PNA         History Obtained From:  patient, electronic medical record    HISTORY OF PRESENT ILLNESS:  The patient is a 45 yo female smoker with no known prior history who presented to the ED with 4 days of SOB, fatigue, subjective fever. She reports that symptoms started on Wednesday. She was feeling very tired, increasing productive cough with clear yellow sputum associated with SOB and subjective fever. She had a virtual appointment with her PCP on the next day and was advised to get a CXR for her cough. However, today she felt worse and decided to come to the hospital.     She denies any CP but endorses chest tightness after each bouts of coughing. No sick contact, no recent travel, no N/V, HA, diarrhea. Of note, she did have positive COVID 19 test in 12/2020 when she has minimal symptoms with SOB and dry cough but was in close contact with her sister who had positive COVID 19. During that time she was not required to be hospitalized or on oxygen for COVID 19 infection. She was free of symptoms until now. She does reports that she is a current smoker with 20 pack year history. She has not been smoking the past few days because of cough and shortness of breath. In the ED, she was found to be mildly hypoxic and was placed on 1L of oxygen satting in the upper 90s. Other labs were unremarkable. CTPA in the ED ruled out PE but does shows multifocal bilateral opacities concerning for pneumonia. She was given Azithromycin, Rocephin, 1 dose of steroid, and breathing treatment. Past Medical History:    History reviewed. No pertinent past medical history. Past Surgical History:    History reviewed. No pertinent surgical history. Medications Priorto Admission:    No medications prior to admission.     Allergies:  Patient has no known normal. No respiratory distress. Breath sounds: Wheezing present. No rales. Chest:      Chest wall: No tenderness. Abdominal:      General: Bowel sounds are normal. There is no distension. Palpations: Abdomen is soft. Tenderness: There is no abdominal tenderness. Musculoskeletal:         General: No swelling, tenderness or deformity. Normal range of motion. Cervical back: Normal range of motion and neck supple. Skin:     General: Skin is warm and dry. Neurological:      General: No focal deficit present. Mental Status: She is alert and oriented to person, place, and time. Cranial Nerves: No cranial nerve deficit. Sensory: No sensory deficit. Psychiatric:         Behavior: Behavior normal.       Physical exam:       Vitals:    05/23/21 0040   BP: 135/81   Pulse: 108   Resp: 20   Temp: 97.3 °F (36.3 °C)   SpO2: 96%       DATA:    Labs:  CBC:   Recent Labs     05/22/21  1750   WBC 4.3   HGB 14.7   HCT 42.8          BMP:   Recent Labs     05/22/21  1750      K 4.0      CO2 27   BUN 12   CREATININE 0.8   GLUCOSE 109*     LFT's: No results for input(s): AST, ALT, ALB, BILITOT, ALKPHOS in the last 72 hours. Troponin: No results for input(s): TROPONINI in the last 72 hours. BNP:No results for input(s): BNP in the last 72 hours. ABGs: No results for input(s): PHART, GYG6ZVN, PO2ART in the last 72 hours. INR: No results for input(s): INR in the last 72 hours. U/A:No results for input(s): NITRITE, COLORU, PHUR, LABCAST, WBCUA, RBCUA, MUCUS, TRICHOMONAS, YEAST, BACTERIA, CLARITYU, SPECGRAV, LEUKOCYTESUR, UROBILINOGEN, BILIRUBINUR, BLOODU, GLUCOSEU, AMORPHOUS in the last 72 hours. Invalid input(s): KETONESU    CT CHEST PULMONARY EMBOLISM W CONTRAST   Final Result      1. No evidence of pulmonary thromboembolism. No acute vascular abnormality.    2. Patchy areas of branching micronodular opacities bilaterally involving all lobes suggesting bronchiolitis versus early bilateral multifocal bronchopneumonia. XR CHEST (2 VW)   Final Result   1. No acute disease. ASSESSMENT AND PLAN:  46year old female smoker with no know past medical history presented with worsening SOB, productive cough, fever and admitted for the concern of PNA and exacerbation of underlying obstructive disease. Acute hypoxic respiratory failure   Most likely due to multifocal pneumonia and exacerbation of underlying suspected obstructive lung disease (?COPD vs Asthma)  Patient has 20 pack year history of smoking, but does not have a previous history of COPD but does present with increase cough, increase sputum production very consistent with an acute exacerbation of possible underlying obstructive lung disease. Breathing treatments   Bronchodilators   Prednisone x 5 days   Azithromycine x 5 days   Suspicious for bacterial multifocal pneumonia, will continue Rocephin  Procal pending   Respiratory panel, and Strep ag, legionella pending   Rapid flu negative     Multifocal pneumonia   Management with Abx as above     Suspected COPD exacerbation   Given her long history of smoking and now presented with productive cough and has expiratory wheezes. Management as above  Counseled on smoking cessation    History of COVID 19   COVID 19 positive in 12/2020 but with minimal symptoms including SOB and dry cough. Did not have to be hospitalized or on oxygen. Sx resolved after several days.    COVID 19 test resent in ED   Given her multifocal pneumonia findings, will place on droplet plus precaution until COVID rule out    Tobacco use   History of 20 pack year smoking history   Current smoking   Nicotine patch   Counseled on smoking cessation     Obesity   BMI of 30.6  Counseled patient on weight loss       Will discuss with attending physician Dr. Paul Danielle Status:Full code  FEN: Regular diet   PPX: SCDs, ambulation, no indication for chemical ppx  DISPO: Frederic Lagos MD,

## 2021-05-23 NOTE — PROGRESS NOTES
4 Eyes Admission Assessment     I agree as the admission nurse that 2 RN's have performed a thorough Head to Toe Skin Assessment on the patient. ALL assessment sites listed below have been assessed on admission. Areas assessed by both nurses:   [x]   Head, Face, and Ears   [x]   Shoulders, Back, and Chest  [x]   Arms, Elbows, and Hands   [x]   Coccyx, Sacrum, and Ischium  [x]   Legs, Feet, and Heels        Does the Patient have Skin Breakdown?   No         Hair Prevention initiated:  NA   Wound Care Orders initiated:  NA      WOC nurse consulted for Pressure Injury (Stage 3,4, Unstageable, DTI, NWPT, and Complex wounds) or Hair score 18 or lower:  NA      Nurse 1 eSignature: Electronically signed by Cayden Hogue RN on 5/23/21 at 7:15 AM EDT    **SHARE this note so that the co-signing nurse is able to place an eSignature**    Nurse 2 eSignature: Electronically signed by Sariah Mcdonough RN on 5/23/21 at 7:19 AM EDT

## 2021-05-23 NOTE — ED PROVIDER NOTES
ED Attending Attestation Note     Date of evaluation: 5/22/2021    This patient was seen by the resident. I have seen and examined the patient, agree with the workup, evaluation, management and diagnosis. The care plan has been discussed. I have reviewed the ECG and concur with the resident's interpretation. My assessment reveals 45 y/o F who presents for cough and shortness of breath. Gradually worsening over the last few days and associated with fatigue. Complains of lower rib pain worse with coughing. Tachycardic 110s, afebrile. SpO2 low 90s on arrival.  Bilateral wheezing. Not in davin distress. CXR negative for acute process. Labs without gross abnormality. Given duonebs with improvement of wheezing, but on recheck patient was persistently hypoxic with a good waveform 87% on room air. Improved to low/mid 90s on 2 L NC oxygen. CT PE study ordered. Anticipate admission for persistent hypoxia. COVID swab pending.      Satnam Chan MD  05/22/21 [id number]

## 2021-05-24 ENCOUNTER — APPOINTMENT (OUTPATIENT)
Dept: GENERAL RADIOLOGY | Age: 51
DRG: 194 | End: 2021-05-24
Payer: COMMERCIAL

## 2021-05-24 LAB
ANION GAP SERPL CALCULATED.3IONS-SCNC: 10 MMOL/L (ref 3–16)
BASOPHILS ABSOLUTE: 0.2 K/UL (ref 0–0.2)
BASOPHILS RELATIVE PERCENT: 3.3 %
BUN BLDV-MCNC: 13 MG/DL (ref 7–20)
CALCIUM SERPL-MCNC: 9.6 MG/DL (ref 8.3–10.6)
CHLORIDE BLD-SCNC: 102 MMOL/L (ref 99–110)
CO2: 27 MMOL/L (ref 21–32)
CREAT SERPL-MCNC: 0.7 MG/DL (ref 0.6–1.1)
EOSINOPHILS ABSOLUTE: 0 K/UL (ref 0–0.6)
EOSINOPHILS RELATIVE PERCENT: 0.1 %
GFR AFRICAN AMERICAN: >60
GFR NON-AFRICAN AMERICAN: >60
GLUCOSE BLD-MCNC: 106 MG/DL (ref 70–99)
HCT VFR BLD CALC: 42.1 % (ref 36–48)
HEMOGLOBIN: 14.5 G/DL (ref 12–16)
L. PNEUMOPHILA SEROGP 1 UR AG: NORMAL
LYMPHOCYTES ABSOLUTE: 1.8 K/UL (ref 1–5.1)
LYMPHOCYTES RELATIVE PERCENT: 32.9 %
MAGNESIUM: 2.1 MG/DL (ref 1.8–2.4)
MCH RBC QN AUTO: 33.9 PG (ref 26–34)
MCHC RBC AUTO-ENTMCNC: 34.4 G/DL (ref 31–36)
MCV RBC AUTO: 98.5 FL (ref 80–100)
MONOCYTES ABSOLUTE: 0.3 K/UL (ref 0–1.3)
MONOCYTES RELATIVE PERCENT: 6 %
NEUTROPHILS ABSOLUTE: 3.2 K/UL (ref 1.7–7.7)
NEUTROPHILS RELATIVE PERCENT: 57.7 %
PDW BLD-RTO: 13.5 % (ref 12.4–15.4)
PLATELET # BLD: 199 K/UL (ref 135–450)
PMV BLD AUTO: 7.1 FL (ref 5–10.5)
POTASSIUM SERPL-SCNC: 3.8 MMOL/L (ref 3.5–5.1)
RBC # BLD: 4.27 M/UL (ref 4–5.2)
SODIUM BLD-SCNC: 139 MMOL/L (ref 136–145)
STREP PNEUMONIAE ANTIGEN, URINE: NORMAL
WBC # BLD: 5.6 K/UL (ref 4–11)

## 2021-05-24 PROCEDURE — 87205 SMEAR GRAM STAIN: CPT

## 2021-05-24 PROCEDURE — 2580000003 HC RX 258: Performed by: STUDENT IN AN ORGANIZED HEALTH CARE EDUCATION/TRAINING PROGRAM

## 2021-05-24 PROCEDURE — 6360000002 HC RX W HCPCS: Performed by: STUDENT IN AN ORGANIZED HEALTH CARE EDUCATION/TRAINING PROGRAM

## 2021-05-24 PROCEDURE — 99223 1ST HOSP IP/OBS HIGH 75: CPT | Performed by: INTERNAL MEDICINE

## 2021-05-24 PROCEDURE — 83735 ASSAY OF MAGNESIUM: CPT

## 2021-05-24 PROCEDURE — 94640 AIRWAY INHALATION TREATMENT: CPT

## 2021-05-24 PROCEDURE — 2060000000 HC ICU INTERMEDIATE R&B

## 2021-05-24 PROCEDURE — 6370000000 HC RX 637 (ALT 250 FOR IP): Performed by: STUDENT IN AN ORGANIZED HEALTH CARE EDUCATION/TRAINING PROGRAM

## 2021-05-24 PROCEDURE — 94761 N-INVAS EAR/PLS OXIMETRY MLT: CPT

## 2021-05-24 PROCEDURE — 36415 COLL VENOUS BLD VENIPUNCTURE: CPT

## 2021-05-24 PROCEDURE — 84443 ASSAY THYROID STIM HORMONE: CPT

## 2021-05-24 PROCEDURE — 6370000000 HC RX 637 (ALT 250 FOR IP): Performed by: INTERNAL MEDICINE

## 2021-05-24 PROCEDURE — 6360000002 HC RX W HCPCS: Performed by: INTERNAL MEDICINE

## 2021-05-24 PROCEDURE — 71046 X-RAY EXAM CHEST 2 VIEWS: CPT

## 2021-05-24 PROCEDURE — 80048 BASIC METABOLIC PNL TOTAL CA: CPT

## 2021-05-24 PROCEDURE — 87070 CULTURE OTHR SPECIMN AEROBIC: CPT

## 2021-05-24 PROCEDURE — 85025 COMPLETE CBC W/AUTO DIFF WBC: CPT

## 2021-05-24 RX ORDER — IPRATROPIUM BROMIDE AND ALBUTEROL SULFATE 2.5; .5 MG/3ML; MG/3ML
1 SOLUTION RESPIRATORY (INHALATION)
Status: DISCONTINUED | OUTPATIENT
Start: 2021-05-24 | End: 2021-05-27 | Stop reason: HOSPADM

## 2021-05-24 RX ORDER — BENZONATATE 100 MG/1
100 CAPSULE ORAL 3 TIMES DAILY PRN
Status: DISCONTINUED | OUTPATIENT
Start: 2021-05-24 | End: 2021-05-27 | Stop reason: HOSPADM

## 2021-05-24 RX ORDER — ARFORMOTEROL TARTRATE 15 UG/2ML
15 SOLUTION RESPIRATORY (INHALATION) 2 TIMES DAILY
Status: DISCONTINUED | OUTPATIENT
Start: 2021-05-24 | End: 2021-05-27 | Stop reason: HOSPADM

## 2021-05-24 RX ORDER — GUAIFENESIN 600 MG/1
600 TABLET, EXTENDED RELEASE ORAL 2 TIMES DAILY
Status: DISCONTINUED | OUTPATIENT
Start: 2021-05-24 | End: 2021-05-26

## 2021-05-24 RX ORDER — AZITHROMYCIN 250 MG/1
250 TABLET, FILM COATED ORAL DAILY
Status: DISCONTINUED | OUTPATIENT
Start: 2021-05-25 | End: 2021-05-25

## 2021-05-24 RX ORDER — BUDESONIDE 0.5 MG/2ML
0.5 INHALANT ORAL 2 TIMES DAILY
Status: DISCONTINUED | OUTPATIENT
Start: 2021-05-24 | End: 2021-05-27 | Stop reason: HOSPADM

## 2021-05-24 RX ADMIN — IPRATROPIUM BROMIDE AND ALBUTEROL SULFATE 1 AMPULE: .5; 2.5 SOLUTION RESPIRATORY (INHALATION) at 11:14

## 2021-05-24 RX ADMIN — CEFTRIAXONE 1000 MG: 1 INJECTION, POWDER, FOR SOLUTION INTRAMUSCULAR; INTRAVENOUS at 16:47

## 2021-05-24 RX ADMIN — ACETAMINOPHEN 650 MG: 325 TABLET ORAL at 13:26

## 2021-05-24 RX ADMIN — GUAIFENESIN 600 MG: 600 TABLET, EXTENDED RELEASE ORAL at 20:28

## 2021-05-24 RX ADMIN — ARFORMOTEROL TARTRATE 15 MCG: 15 SOLUTION RESPIRATORY (INHALATION) at 19:50

## 2021-05-24 RX ADMIN — BENZONATATE 100 MG: 100 CAPSULE ORAL at 05:47

## 2021-05-24 RX ADMIN — Medication 10 ML: at 20:30

## 2021-05-24 RX ADMIN — ENOXAPARIN SODIUM 40 MG: 40 INJECTION SUBCUTANEOUS at 12:02

## 2021-05-24 RX ADMIN — IPRATROPIUM BROMIDE AND ALBUTEROL SULFATE 1 AMPULE: .5; 2.5 SOLUTION RESPIRATORY (INHALATION) at 15:07

## 2021-05-24 RX ADMIN — FAMOTIDINE 20 MG: 20 TABLET ORAL at 09:15

## 2021-05-24 RX ADMIN — FAMOTIDINE 20 MG: 20 TABLET ORAL at 20:28

## 2021-05-24 RX ADMIN — BENZONATATE 100 MG: 100 CAPSULE ORAL at 20:27

## 2021-05-24 RX ADMIN — GUAIFENESIN 600 MG: 600 TABLET, EXTENDED RELEASE ORAL at 13:23

## 2021-05-24 RX ADMIN — Medication 10 ML: at 10:25

## 2021-05-24 RX ADMIN — PREDNISONE 40 MG: 20 TABLET ORAL at 09:15

## 2021-05-24 RX ADMIN — ACETAMINOPHEN 650 MG: 325 TABLET ORAL at 05:16

## 2021-05-24 RX ADMIN — IPRATROPIUM BROMIDE AND ALBUTEROL 1 PUFF: 20; 100 SPRAY, METERED RESPIRATORY (INHALATION) at 07:39

## 2021-05-24 RX ADMIN — AZITHROMYCIN MONOHYDRATE 250 MG: 250 TABLET ORAL at 09:15

## 2021-05-24 RX ADMIN — IPRATROPIUM BROMIDE AND ALBUTEROL SULFATE 1 AMPULE: .5; 2.5 SOLUTION RESPIRATORY (INHALATION) at 19:50

## 2021-05-24 RX ADMIN — ACETAMINOPHEN 650 MG: 325 TABLET ORAL at 20:28

## 2021-05-24 RX ADMIN — BENZONATATE 100 MG: 100 CAPSULE ORAL at 12:14

## 2021-05-24 RX ADMIN — BUDESONIDE 500 MCG: 0.5 SUSPENSION RESPIRATORY (INHALATION) at 19:50

## 2021-05-24 ASSESSMENT — PAIN SCALES - GENERAL
PAINLEVEL_OUTOF10: 0
PAINLEVEL_OUTOF10: 2
PAINLEVEL_OUTOF10: 1
PAINLEVEL_OUTOF10: 3
PAINLEVEL_OUTOF10: 5
PAINLEVEL_OUTOF10: 0
PAINLEVEL_OUTOF10: 3
PAINLEVEL_OUTOF10: 0

## 2021-05-24 ASSESSMENT — ENCOUNTER SYMPTOMS
CHEST TIGHTNESS: 0
WHEEZING: 1
SHORTNESS OF BREATH: 1
COUGH: 1

## 2021-05-24 ASSESSMENT — PAIN DESCRIPTION - PAIN TYPE: TYPE: ACUTE PAIN

## 2021-05-24 NOTE — PROGRESS NOTES
Internal Medicine  PGY1  Progress note    CC   Chief Complaint   Patient presents with    Cough     since Wednesday, missed appt for OP CXR on Thursday, thinks she has PNA         History Obtained From:  patient, electronic medical record    Interval Hx:  Beacher Spikes. Pt afeb, other vitals stable. RFP/CBC stable, no leukocytosis. Procalc 0.1. Resp viral panel diatherix not  Ordered  but resp mini panel pending. COVID -ve, Strep Pneumo antigen/Leigionella antigen -ve. Resp cult expectorated pending. SpO2 94 percent when not talking and 91-93 % with conversation on RA. Clinically she looks not so well. She states she improved yesterday but today her cough has gotten worse and her chest tightness is coming back which improved yesterday. Her cough is dry but she feel she has a lot of sputum stuck in her chest that is not coming out. and she has to blow her nose several times with blood coming out now. I got repeat CXR which does not show any acute pathology. She continues to have wheezes. She may have underlying COPD however would need PFTs for that as an outpatient. Day 3 rocephin/Azithro  Day 3 prednisone  Would like to give more time for clinical improvement. HISTORY OF PRESENT ILLNESS:  The patient is a 45 yo female smoker with no known prior history who presented to the ED with 4 days of SOB, fatigue, subjective fever. She reports that symptoms started on Wednesday. She was feeling very tired, increasing productive cough with clear yellow sputum associated with SOB and subjective fever. She had a virtual appointment with her PCP on the next day and was advised to get a CXR for her cough. However, today she felt worse and decided to come to the hospital.     She denies any CP but endorses chest tightness after each bouts of coughing. No sick contact, no recent travel, no N/V, HA, diarrhea.  Of note, she did have positive COVID 19 test in 12/2020 when she has minimal symptoms with SOB and dry cough but was in close contact with her sister who had positive COVID 19. During that time she was not required to be hospitalized or on oxygen for COVID 19 infection. She was free of symptoms until now. She does reports that she is a current smoker with 20 pack year history. She has not been smoking the past few days because of cough and shortness of breath. In the ED, she was found to be mildly hypoxic and was placed on 1L of oxygen satting in the upper 90s. Other labs were unremarkable. CTPA in the ED ruled out PE but does shows multifocal bilateral opacities concerning for pneumonia. She was given Azithromycin, Rocephin, 1 dose of steroid, and breathing treatment. Past Medical History:    History reviewed. No pertinent past medical history. Past Surgical History:    History reviewed. No pertinent surgical history. Medications Priorto Admission:    No medications prior to admission. Allergies:  Patient has no known allergies. Social History:   · TOBACCO:   reports that she has been smoking. She has been smoking about 0.50 packs per day. She has never used smokeless tobacco.  · ETOH:   reports no history of alcohol use. · DRUGS : denies   · Patient currently lives with family at home  ·   Family History:   Family history of DM (father)      Physical exam:        Physical Exam  Vitals and nursing note reviewed. Constitutional:       General: She is not in acute distress. Appearance: Normal appearance. She is well-developed. She is obese. She is not ill-appearing, toxic-appearing or diaphoretic. HENT:      Head: Normocephalic and atraumatic. Eyes:      General: No scleral icterus. Conjunctiva/sclera: Conjunctivae normal.      Pupils: Pupils are equal, round, and reactive to light. Cardiovascular:      Rate and Rhythm: Regular rhythm. present. normal rate and rhythm      Heart sounds: Normal heart sounds. No murmur heard. No friction rub. No gallop.     Pulmonary:      Effort: Pulmonary effort is thromboembolism. No acute vascular abnormality. 2. Patchy areas of branching micronodular opacities bilaterally involving all lobes suggesting bronchiolitis versus early bilateral multifocal bronchopneumonia. XR CHEST (2 VW)   Final Result   1. No acute disease. ASSESSMENT AND PLAN:  46year old female smoker with no know past medical history presented with worsening SOB, productive cough, fever and admitted for the concern of PNA and exacerbation of underlying obstructive disease. Acute hypoxic respiratory failure   Most likely due to multifocal pneumonia (likely viral) and exacerbation of underlying suspected obstructive lung disease (?COPD vs Asthma)  Patient has 20 pack year history of smoking, but does not have a previous history of COPD but does present with increase cough, increase sputum production very consistent with an acute exacerbation of possible underlying obstructive lung disease. Duo nebs  Procal 0.1  Respiratory panel pendiong, and Strep ag/legionella -ve  Rapid flu negative   Resp cult pending  Repeat CXR shows no consolidation, pointing more towards viral etiology  Consider dcing antibiotics and continue sterodis    Multifocal pneumonia   Management with Abx as above     Suspected COPD exacerbation   Given her long history of smoking and now presented with productive cough and has expiratory wheezes. Management as above  Counseled on smoking cessation  Needs outpatient PFTs    History of COVID 19   COVID 19 positive in 12/2020 but with minimal symptoms including SOB and dry cough. Did not have to be hospitalized or on oxygen. Sx resolved after several days.    COVID 19 test resent which is negative    Tobacco use   History of 20 pack year smoking history   Current smoking   Nicotine patch   Counseled on smoking cessation     Obesity   BMI of 30.6  Counseled patient on weight loss       Will discuss with attending physician Dr. Antonio Duarte Status:Full code  FEN: Regular diet   PPX: Lovenox  DISPO: Julia Troy MD, PGY 1  Internal Medicine   Please contact via [employer]   5/24/2021,  10:56 AM

## 2021-05-24 NOTE — PLAN OF CARE
Patient have been encouraged to take in deep breaths, 02 sats remain above 92% on 4L. Patient denies SOB at this time.

## 2021-05-24 NOTE — PROGRESS NOTES
Clinically unable to determine / Unknown  -- Refer to Clinical Documentation Reviewer    PROVIDER RESPONSE TEXT:    Hypoxia only. Acute Respiratory Failure has been ruled out after study. Query created by: Nadine Mayo on 5/24/2021 7:54 AM      QUERY TEXT:    Dear Provider,    Pt admitted with multifocal pneumonia. If possible, please document the type   of pneumonia in the progress notes and discharge summary you are evaluating   and/or treating. Note: CAP and HCAP indicate where the pneumonia was acquired, not a specific   type. The medical record reflects the following:  Risk Factors: 20 year smoking hx  Clinical Indicators: Patient with cough, wheezing  and SOB. H & P   documentation of suspicious for bacterial multifocal pneumonia, will continue   Rocephin.  5/22  Pulmonary CTA  shown patchy areas of branching micronodular opacities   bilaterally involving all lobes suggesting bronchiolitis versus early   bilateral multifocal bronchopneumonia. Procalcitonin 0.10  Treatment: Rocephin, Azithromycin X 5 days  Options provided:  -- Gram positive pneumonia  -- Other - I will add my own diagnosis  -- Disagree - Not applicable / Not valid  -- Disagree - Clinically unable to determine / Unknown  -- Refer to Clinical Documentation Reviewer    PROVIDER RESPONSE TEXT:    This patient has gram positive pneumonia.     Query created by: Nadine Mayo on 5/24/2021 8:05 AM      Electronically signed by:  Stephanie Boone MD 5/24/2021 8:24 AM

## 2021-05-24 NOTE — CONSULTS
Pulmonary Consult    Patient's PCP: Norm Joel MD  Referred by: Kalyn Ramos MD for LRTI    HISTORY OF PRESENT ILLNESS:    This is a  46 y.o. female presented to the ED with shortness of breath, fever and fatigue, progressively getting worse over the past few days. During my evaluation she had several episodes of cough with wheezing. Usually she has chronic cough, she also has hx of pneumonia before and COVID19 in Dec.  She currently smokes 1/2 PPD. She started smoking in her mid 25s. She quit smoking for 5 years before. She doesn't carry a diagnosis of asthma or COPD, however she has hx of seasonal sinus congestion and post nasal drip. Rarely she has acid reflux. Past Medical / Surgical History:    History reviewed. No pertinent past medical history. History reviewed. No pertinent surgical history. Medications Prior to Admission:    No current facility-administered medications on file prior to encounter. No current outpatient medications on file prior to encounter. Allergies:  Patient has no known allergies. Social History:   TOBACCO:   reports that she has been smoking. She has been smoking about 0.50 packs per day. She has never used smokeless tobacco.     ETOH:   reports no history of alcohol use. Family History:   History reviewed. No pertinent family history. Review of Systems   Constitutional: Positive for fatigue and fever. Negative for chills and unexpected weight change. HENT: Negative for congestion. Respiratory: Positive for cough, shortness of breath and wheezing. Negative for chest tightness. Cardiovascular: Negative for chest pain, palpitations and leg swelling. Neurological: Negative for weakness. Psychiatric/Behavioral: The patient is not nervous/anxious. All other systems reviewed and are negative.       PHYSICAL EXAM:  BP (!) 151/83   Pulse 88   Temp 97.4 °F (36.3 °C) (Oral)   Resp 24   Ht 5' 6\" (1.676 m)   Wt 196 lb 3.4 oz (89 kg) SpO2 92%   BMI 31.67 kg/m²     Physical Exam  Vitals reviewed. Constitutional:       Appearance: She is well-developed. HENT:      Head: Normocephalic and atraumatic. Eyes:      Extraocular Movements: Extraocular movements intact. Pupils: Pupils are equal, round, and reactive to light. Neck:      Vascular: No JVD. Cardiovascular:      Rate and Rhythm: Normal rate and regular rhythm. Heart sounds: No murmur heard. Pulmonary:      Effort: Pulmonary effort is normal. No respiratory distress. Breath sounds: No stridor. Wheezing and rhonchi present. No rales. Abdominal:      General: Bowel sounds are normal.      Palpations: Abdomen is soft. Musculoskeletal:         General: No deformity. Cervical back: Neck supple. Right lower leg: No edema. Left lower leg: No edema. Skin:     General: Skin is warm and dry. Neurological:      Mental Status: She is alert and oriented to person, place, and time. Psychiatric:         Behavior: Behavior normal.         LABS:  Recent Labs     05/22/21  1750 05/24/21  0501   WBC 4.3 5.6   HGB 14.7 14.5   HCT 42.8 42.1    199                                                                  Recent Labs     05/22/21  1750 05/23/21  0501 05/24/21  0501    138 139   K 4.0 4.5 3.8    101 102   CO2 27 28 27   BUN 12 14 13   CREATININE 0.8 0.8 0.7   GLUCOSE 109* 167* 106*     No results for input(s): AST, ALT, ALB, BILITOT, ALKPHOS in the last 72 hours. No results for input(s): TROPONINI in the last 72 hours. No results for input(s): BNP in the last 72 hours. No results found for: PHART, SBE7LTF, PO2ART  No results for input(s): INR in the last 72 hours. @LABRCNT(NITRITE,COLORU,PHUR,LABCAST,WBCUA,RBCUA,MUCUS,TRICHOMONAS,YEAST,BACTERIA,CLARITYU,SPECGRAV,LEUKOCYTESUR,UROBILINOGEN,BILIRUBINUR,BLOODU,GLUCOSEU,KETONESU,AMORPHOUS)@     DATA:  History: Cough.  Dyspnea.       2 views chest.       COMPARISON: 5/22/2021.     FINDINGS: Normal heart size. No effusion or consolidation. No acute osseous abnormality. Mediastinal contours are unremarkable.           Impression   1. No acute disease     CT chest       1. No evidence of pulmonary thromboembolism. No acute vascular abnormality. 2. Patchy areas of branching micronodular opacities bilaterally involving all lobes suggesting bronchiolitis versus early bilateral multifocal bronchopneumonia. Assessment &Plan:    Patient Active Problem List:     Acute respiratory failure with hypoxia (HCC)      Multifocal bronchopneumonia   Reactive airway disease. ?COPD vs asthma. No prior PFT   Smoker  Incontinence due to chronic cough    Continue azithromycin   Start ceftriaxone   Continue prednisone   Start brovana and pulmicort   Counseled to quit smoking  She will need PFT on outpatient bases. The patient and / or the family were informed of the results of any tests, a time was given to answer questions, a plan was proposed and they agreed with plan. Thank you Dr. Jessica Marks MD for the opportunity to be involved in this patients care.  If you have any questions or concerns please feel free to contact me  Full Code

## 2021-05-25 LAB
ALBUMIN SERPL-MCNC: 4 G/DL (ref 3.4–5)
ANION GAP SERPL CALCULATED.3IONS-SCNC: 7 MMOL/L (ref 3–16)
BASOPHILS ABSOLUTE: 0 K/UL (ref 0–0.2)
BASOPHILS RELATIVE PERCENT: 0.2 %
BUN BLDV-MCNC: 19 MG/DL (ref 7–20)
CALCIUM SERPL-MCNC: 9.6 MG/DL (ref 8.3–10.6)
CHLORIDE BLD-SCNC: 104 MMOL/L (ref 99–110)
CO2: 29 MMOL/L (ref 21–32)
CREAT SERPL-MCNC: 0.9 MG/DL (ref 0.6–1.1)
EOSINOPHILS ABSOLUTE: 0 K/UL (ref 0–0.6)
EOSINOPHILS RELATIVE PERCENT: 0 %
GFR AFRICAN AMERICAN: >60
GFR NON-AFRICAN AMERICAN: >60
GLUCOSE BLD-MCNC: 91 MG/DL (ref 70–99)
HCT VFR BLD CALC: 40.8 % (ref 36–48)
HEMOGLOBIN: 14.2 G/DL (ref 12–16)
LYMPHOCYTES ABSOLUTE: 1.9 K/UL (ref 1–5.1)
LYMPHOCYTES RELATIVE PERCENT: 36.6 %
MAGNESIUM: 2.2 MG/DL (ref 1.8–2.4)
MCH RBC QN AUTO: 34.2 PG (ref 26–34)
MCHC RBC AUTO-ENTMCNC: 34.7 G/DL (ref 31–36)
MCV RBC AUTO: 98.7 FL (ref 80–100)
MONOCYTES ABSOLUTE: 0.3 K/UL (ref 0–1.3)
MONOCYTES RELATIVE PERCENT: 6 %
NEUTROPHILS ABSOLUTE: 3 K/UL (ref 1.7–7.7)
NEUTROPHILS RELATIVE PERCENT: 57.2 %
PDW BLD-RTO: 13.3 % (ref 12.4–15.4)
PHOSPHORUS: 3.9 MG/DL (ref 2.5–4.9)
PLATELET # BLD: 195 K/UL (ref 135–450)
PMV BLD AUTO: 7.1 FL (ref 5–10.5)
POTASSIUM SERPL-SCNC: 4 MMOL/L (ref 3.5–5.1)
RBC # BLD: 4.14 M/UL (ref 4–5.2)
SODIUM BLD-SCNC: 140 MMOL/L (ref 136–145)
TSH REFLEX: 0.88 UIU/ML (ref 0.27–4.2)
WBC # BLD: 5.3 K/UL (ref 4–11)

## 2021-05-25 PROCEDURE — 36415 COLL VENOUS BLD VENIPUNCTURE: CPT

## 2021-05-25 PROCEDURE — 6360000002 HC RX W HCPCS: Performed by: STUDENT IN AN ORGANIZED HEALTH CARE EDUCATION/TRAINING PROGRAM

## 2021-05-25 PROCEDURE — 6370000000 HC RX 637 (ALT 250 FOR IP): Performed by: STUDENT IN AN ORGANIZED HEALTH CARE EDUCATION/TRAINING PROGRAM

## 2021-05-25 PROCEDURE — 2580000003 HC RX 258: Performed by: STUDENT IN AN ORGANIZED HEALTH CARE EDUCATION/TRAINING PROGRAM

## 2021-05-25 PROCEDURE — 94640 AIRWAY INHALATION TREATMENT: CPT

## 2021-05-25 PROCEDURE — 85025 COMPLETE CBC W/AUTO DIFF WBC: CPT

## 2021-05-25 PROCEDURE — 6360000002 HC RX W HCPCS: Performed by: INTERNAL MEDICINE

## 2021-05-25 PROCEDURE — 6370000000 HC RX 637 (ALT 250 FOR IP): Performed by: INTERNAL MEDICINE

## 2021-05-25 PROCEDURE — 2060000000 HC ICU INTERMEDIATE R&B

## 2021-05-25 PROCEDURE — 83735 ASSAY OF MAGNESIUM: CPT

## 2021-05-25 PROCEDURE — 99233 SBSQ HOSP IP/OBS HIGH 50: CPT | Performed by: INTERNAL MEDICINE

## 2021-05-25 PROCEDURE — 80069 RENAL FUNCTION PANEL: CPT

## 2021-05-25 PROCEDURE — 94664 DEMO&/EVAL PT USE INHALER: CPT

## 2021-05-25 RX ORDER — LEVOFLOXACIN 750 MG/1
750 TABLET ORAL DAILY
Status: DISCONTINUED | OUTPATIENT
Start: 2021-05-25 | End: 2021-05-25

## 2021-05-25 RX ORDER — AZITHROMYCIN 250 MG/1
250 TABLET, FILM COATED ORAL DAILY
Status: DISCONTINUED | OUTPATIENT
Start: 2021-05-26 | End: 2021-05-27 | Stop reason: HOSPADM

## 2021-05-25 RX ADMIN — BENZONATATE 100 MG: 100 CAPSULE ORAL at 21:04

## 2021-05-25 RX ADMIN — IPRATROPIUM BROMIDE AND ALBUTEROL SULFATE 1 AMPULE: .5; 2.5 SOLUTION RESPIRATORY (INHALATION) at 08:11

## 2021-05-25 RX ADMIN — CEFTRIAXONE 1000 MG: 1 INJECTION, POWDER, FOR SOLUTION INTRAMUSCULAR; INTRAVENOUS at 18:27

## 2021-05-25 RX ADMIN — GUAIFENESIN 600 MG: 600 TABLET, EXTENDED RELEASE ORAL at 21:04

## 2021-05-25 RX ADMIN — FAMOTIDINE 20 MG: 20 TABLET ORAL at 09:23

## 2021-05-25 RX ADMIN — BUDESONIDE 500 MCG: 0.5 SUSPENSION RESPIRATORY (INHALATION) at 08:11

## 2021-05-25 RX ADMIN — ENOXAPARIN SODIUM 40 MG: 40 INJECTION SUBCUTANEOUS at 09:23

## 2021-05-25 RX ADMIN — IPRATROPIUM BROMIDE AND ALBUTEROL SULFATE 1 AMPULE: .5; 2.5 SOLUTION RESPIRATORY (INHALATION) at 13:47

## 2021-05-25 RX ADMIN — Medication 5 ML: at 21:05

## 2021-05-25 RX ADMIN — Medication 10 ML: at 09:23

## 2021-05-25 RX ADMIN — PREDNISONE 40 MG: 20 TABLET ORAL at 09:22

## 2021-05-25 RX ADMIN — IPRATROPIUM BROMIDE AND ALBUTEROL SULFATE 1 AMPULE: .5; 2.5 SOLUTION RESPIRATORY (INHALATION) at 21:05

## 2021-05-25 RX ADMIN — BENZONATATE 100 MG: 100 CAPSULE ORAL at 04:22

## 2021-05-25 RX ADMIN — ACETAMINOPHEN 650 MG: 325 TABLET ORAL at 21:04

## 2021-05-25 RX ADMIN — ARFORMOTEROL TARTRATE 15 MCG: 15 SOLUTION RESPIRATORY (INHALATION) at 08:11

## 2021-05-25 RX ADMIN — GUAIFENESIN 600 MG: 600 TABLET, EXTENDED RELEASE ORAL at 09:23

## 2021-05-25 RX ADMIN — BUDESONIDE 500 MCG: 0.5 SUSPENSION RESPIRATORY (INHALATION) at 21:05

## 2021-05-25 RX ADMIN — ACETAMINOPHEN 650 MG: 325 TABLET ORAL at 04:21

## 2021-05-25 RX ADMIN — AZITHROMYCIN MONOHYDRATE 250 MG: 250 TABLET ORAL at 09:23

## 2021-05-25 RX ADMIN — FAMOTIDINE 20 MG: 20 TABLET ORAL at 21:04

## 2021-05-25 RX ADMIN — ARFORMOTEROL TARTRATE 15 MCG: 15 SOLUTION RESPIRATORY (INHALATION) at 21:05

## 2021-05-25 ASSESSMENT — PAIN SCALES - GENERAL
PAINLEVEL_OUTOF10: 2
PAINLEVEL_OUTOF10: 2
PAINLEVEL_OUTOF10: 0

## 2021-05-25 NOTE — PLAN OF CARE
Problem: Airway Clearance - Ineffective:  Goal: Ability to maintain a clear airway will improve  Description: Ability to maintain a clear airway will improve  5/25/2021 0312 by Reba Jamison RN  Outcome: Ongoing  O2 weaned to 1L while maintaining Sp02 > 94%; pt tachypneic/dyspneic following exertion; spot-check after ambulation showed Sp02 holding > 92%; end-expiratory wheezing auscultated and RT gave scheduled breathing tx and PRN duoneb. Problem: Pain:  Goal: Pain level will decrease  Description: Pain level will decrease  Outcome: Met This Shift  C/O 3/10 headache alleviated with PRN tylenol      Problem: Airway Clearance - Ineffective:  Goal: Ability to maintain a clear airway will improve  Description: Ability to maintain a clear airway will improve  Outcome: Ongoing  Pt has frequent congested/non-productive cough and becomes tachycardic; scheduled robitussin xr and PRN tessalon administered with noticeable relief      Pt began to feel anxious/hot/tachypneic and skin was visibly flushed; Washed with cool wipes/ice applied and pt stated they felt better;  MD paged and made aware and asked if we could have orders to shower and if pt's nicotine patch dosage could be increased to 21mg.

## 2021-05-25 NOTE — PROGRESS NOTES
Internal Medicine  PGY1  Progress note    CC   Chief Complaint   Patient presents with    Cough     since Wednesday, missed appt for OP CXR on Thursday, thinks she has PNA         History Obtained From:  patient, electronic medical record    Interval Hx:  [place] Grumbling. Pt afeb, other vitals stable. RFP/CBC stable, no leukocytosis. SpO2 93 percent on RA. She has bronchopneumonia. Clinically she looks significantly better. Cough is decreased in frequency although it is still dry and she still feels like she has sputum stuck in her chest that she can not bring up. She may have underlying COPD however would need PFTs for that as an outpatient. Day 4 rocephin/Azithro  Day 4 prednisone  Would like to switch to PO antibiotic today      HISTORY OF PRESENT ILLNESS:  The patient is a 45 yo female smoker with no known prior history who presented to the ED with 4 days of SOB, fatigue, subjective fever. She reports that symptoms started on Wednesday. She was feeling very tired, increasing productive cough with clear yellow sputum associated with SOB and subjective fever. She had a virtual appointment with her PCP on the next day and was advised to get a CXR for her cough. However, today she felt worse and decided to come to the hospital.     She denies any CP but endorses chest tightness after each bouts of coughing. No sick contact, no recent travel, no N/V, HA, diarrhea. Of note, she did have positive COVID 19 test in 12/2020 when she has minimal symptoms with SOB and dry cough but was in close contact with her sister who had positive COVID 19. During that time she was not required to be hospitalized or on oxygen for COVID 19 infection. She was free of symptoms until now. She does reports that she is a current smoker with 20 pack year history. She has not been smoking the past few days because of cough and shortness of breath.      In the ED, she was found to be mildly hypoxic and was placed on 1L of oxygen satting in the upper 90s. Other labs were unremarkable. CTPA in the ED ruled out PE but does shows multifocal bilateral opacities concerning for pneumonia. She was given Azithromycin, Rocephin, 1 dose of steroid, and breathing treatment. Past Medical History:    History reviewed. No pertinent past medical history. Past Surgical History:    History reviewed. No pertinent surgical history. Medications Priorto Admission:    No medications prior to admission. Allergies:  Patient has no known allergies. Social History:   · TOBACCO:   reports that she has been smoking. She has been smoking about 0.50 packs per day. She has never used smokeless tobacco.  · ETOH:   reports no history of alcohol use. · DRUGS : denies   · Patient currently lives with family at home  ·   Family History:   Family history of DM (father)      Physical exam:        Physical Exam  Vitals and nursing note reviewed. Constitutional:       General: She is not in acute distress. Appearance: Normal appearance. She is well-developed. She is obese. She is not ill-appearing, toxic-appearing or diaphoretic. HENT:      Head: Normocephalic and atraumatic. Eyes:      General: No scleral icterus. Conjunctiva/sclera: Conjunctivae normal.      Pupils: Pupils are equal, round, and reactive to light. Cardiovascular:      Rate and Rhythm: Regular rhythm. present. normal rate and rhythm      Heart sounds: Normal heart sounds. No murmur heard. No friction rub. No gallop. Pulmonary:      Effort: Pulmonary effort is normal. No respiratory distress. Breath sounds: Wheezing present. No rales. Chest:      Chest wall: No tenderness. Abdominal:      General: Bowel sounds are normal. There is no distension. Palpations: Abdomen is soft. Tenderness: There is no abdominal tenderness. Musculoskeletal:         General: No swelling, tenderness or deformity. Normal range of motion. Cervical back: Normal range of motion and neck supple. Skin:     General: Skin is warm and dry. Neurological:      General: No focal deficit present. Mental Status: She is alert and oriented to person, place, and time. Cranial Nerves: No cranial nerve deficit. Sensory: No sensory deficit. Psychiatric:         Behavior: Behavior normal.        Vitals:    05/25/21 0813   BP:    Pulse:    Resp:    Temp:    SpO2: 96%       DATA:    Labs:  CBC:   Recent Labs     05/22/21  1750 05/24/21  0501 05/25/21  0528   WBC 4.3 5.6 5.3   HGB 14.7 14.5 14.2   HCT 42.8 42.1 40.8    199 195       BMP:   Recent Labs     05/23/21  0501 05/24/21  0501 05/25/21  0528    139 140   K 4.5 3.8 4.0    102 104   CO2 28 27 29   BUN 14 13 19   CREATININE 0.8 0.7 0.9   GLUCOSE 167* 106* 91   PHOS  --   --  3.9     LFT's: No results for input(s): AST, ALT, ALB, BILITOT, ALKPHOS in the last 72 hours. Troponin: No results for input(s): TROPONINI in the last 72 hours. BNP:No results for input(s): BNP in the last 72 hours. ABGs: No results for input(s): PHART, EJE3NNL, PO2ART in the last 72 hours. INR: No results for input(s): INR in the last 72 hours. U/A:No results for input(s): NITRITE, COLORU, PHUR, LABCAST, WBCUA, RBCUA, MUCUS, TRICHOMONAS, YEAST, BACTERIA, CLARITYU, SPECGRAV, LEUKOCYTESUR, UROBILINOGEN, BILIRUBINUR, BLOODU, GLUCOSEU, AMORPHOUS in the last 72 hours. Invalid input(s): KETONESU    XR CHEST (2 VW)   Final Result   1. No acute disease. CT CHEST PULMONARY EMBOLISM W CONTRAST   Final Result      1. No evidence of pulmonary thromboembolism. No acute vascular abnormality. 2. Patchy areas of branching micronodular opacities bilaterally involving all lobes suggesting bronchiolitis versus early bilateral multifocal bronchopneumonia. XR CHEST (2 VW)   Final Result   1. No acute disease.               ASSESSMENT AND PLAN:  46year old female smoker with no know past medical history presented with worsening SOB, productive cough, fever and

## 2021-05-25 NOTE — PROGRESS NOTES
[facility]    Respiratory Therapy     Home Oxygen Evaluation        Name: Hemant Record Number: [id number]  Age: 46 y.o. Gender:  female   : 1970  Today's date: 2021  Room: [address] [place] [address]      Assessment        BP (!) 144/89   Pulse 73   Temp 97 °F (36.1 °C) (Oral)   Resp 24   Ht 5' 6\" (1.676 m)   Wt 196 lb 12.8 oz (89.3 kg)   SpO2 96%   BMI 31.76 kg/m²      Patient Active Problem List   Diagnosis    Acute respiratory failure with hypoxia (HCC)       Social History:  Social History     Tobacco Use    Smoking status: Current Every Day Smoker     Packs/day: 0.50    Smokeless tobacco: Never Used   Vaping Use    Vaping Use: Never used   Substance Use Topics    Alcohol use: No    Drug use: No       Patient Room Air saturation at rest 97  %  Patient Room Air saturation upon ambulation 93 %    Oxygen saturations of 88% or less on RA qualifies patient for Home Oxygen    Patient resting on 0  lmp  with an oxygen saturation of  97 %     Patient ambulated on 0 lpm with an oxygen saturation of 93%      In your clinical assessment does the Patient Require Portable Oxygen Tanks? No: Patient's room air oxygen saturation did not drop below 93% with ambulation.                 Patient/caregiver was educated on Home Oxygen process:  No: n/a           Matias Cain RCP on 2021 at 8:18 AM                 .

## 2021-05-25 NOTE — PLAN OF CARE
Problem: Pain:  Description: Pain management should include both nonpharmacologic and pharmacologic interventions. Goal: Pain level will decrease  5/25/2021 1541 by Jose Aparicio RN  Outcome: Met This Shift  She denies pain or discomfort. Instructed to inform staff if level of comfort changes. Verbalized understanding. Will monitor. Problem: OXYGENATION/RESPIRATORY FUNCTION  Goal: Patient will maintain patent airway  5/25/2021 1540 by Jose Apaircio RN  Outcome: Ongoing  Remains congested with expiratory wheezing. Patient has been on room air. She has continued to deny shortness of breath or difficulty breathing. Will continue to monitor respiratory status.

## 2021-05-25 NOTE — PROGRESS NOTES
Pulmonary & Critical Care Medicine    Admit Date: 2021  PCP: Kary Abrams MD    CC:  Pneumonia   Events of Last 24 hours:   Overall doing better, though still wheezing and has recurrent episodes of cough. There is no fever or chills. Vitals:  Tmax:  VITALS:  BP (!) 141/94   Pulse 92   Temp 97.7 °F (36.5 °C) (Oral)   Resp 20   Ht 5' 6\" (1.676 m)   Wt 196 lb 12.8 oz (89.3 kg)   SpO2 92%   BMI 31.76 kg/m²   24HR INTAKE/OUTPUT:      Intake/Output Summary (Last 24 hours) at 2021 1004  Last data filed at 2021 2352  Gross per 24 hour   Intake 240 ml   Output 500 ml   Net -260 ml     CURRENT PULSE OXIMETRY:  SpO2: 92 %  24HR PULSE OXIMETRY RANGE:  SpO2  Av.5 %  Min: 90 %  Max: 98 %    EXAM:  General: No distress. Alert  Eyes: PERRL. No sclera icterus. No conjunctival injection. ENT: No discharge. Moist oral mucosa   Neck: Trachea midline. Neck is supple   Resp: No accessory muscle use. Bilateral wheezing   CV: Regular rate. Regular rhythm. No mumur or rub. No edema. GI: Non-tender. Non-distended. Normal bowel sounds. Neuro: Awake. Speech is clear. Psych: No anxiety or agitation.      Medications:    IV:   sodium chloride           Scheduled Meds:   ipratropium-albuterol  1 ampule Nebulization Q4H WA    enoxaparin  40 mg Subcutaneous Daily    guaiFENesin  600 mg Oral BID    azithromycin  250 mg Oral Daily    Arformoterol Tartrate  15 mcg Nebulization BID    budesonide  0.5 mg Nebulization BID    cefTRIAXone (ROCEPHIN) IV  1,000 mg Intravenous Q24H    sodium chloride flush  5-40 mL Intravenous 2 times per day    famotidine  20 mg Oral BID    nicotine  1 patch Transdermal Daily    predniSONE  40 mg Oral Daily         Diet: DIET GENERAL;     Results:  CBC:   Recent Labs     21  1750 21  0501 21  0528   WBC 4.3 5.6 5.3   HGB 14.7 14.5 14.2   HCT 42.8 42.1 40.8   MCV 98.3 98.5 98.7    199 195     BMP:   Recent Labs     21  0501 21  0503 05/25/21  0528    139 140   K 4.5 3.8 4.0    102 104   CO2 28 27 29   PHOS  --   --  3.9   BUN 14 13 19   CREATININE 0.8 0.7 0.9     LIVER PROFILE: No results for input(s): AST, ALT, LIPASE, BILIDIR, BILITOT, ALKPHOS in the last 72 hours. Invalid input(s): AMYLASE,  ALB  PT/INR: No results for input(s): PROTIME, INR in the last 72 hours. APTT: No results for input(s): APTT in the last 72 hours. UA:No results for input(s): NITRITE, COLORU, PHUR, LABCAST, WBCUA, RBCUA, MUCUS, TRICHOMONAS, YEAST, BACTERIA, CLARITYU, SPECGRAV, LEUKOCYTESUR, UROBILINOGEN, BILIRUBINUR, BLOODU, GLUCOSEU, AMORPHOUS in the last 72 hours. Invalid input(s): KETONESU      Assessment/Plan:  46 y.o. female with     Multifocal bronchopneumonia   Reactive airway disease. ?COPD exacerbation vs asthma. No prior PFT   Smoker  Incontinence due to chronic cough     Continue azithromycin and ceftriaxone while inpatient. Switch to levaquin on discharge. Continue prednisone 40mg daily for 5 days. Continue brovana and pulmicort while inpatient. Switch to LABA/ICS on discharge. Counseled to quit smoking  She will need PFT on outpatient bases.    Discussed with primary team      Geri Burton MD, MD

## 2021-05-26 LAB
ALBUMIN SERPL-MCNC: 4 G/DL (ref 3.4–5)
ANION GAP SERPL CALCULATED.3IONS-SCNC: 9 MMOL/L (ref 3–16)
BASOPHILS ABSOLUTE: 0 K/UL (ref 0–0.2)
BASOPHILS RELATIVE PERCENT: 0.3 %
BUN BLDV-MCNC: 17 MG/DL (ref 7–20)
C-REACTIVE PROTEIN: <3 MG/L (ref 0–5.1)
CALCIUM SERPL-MCNC: 9.8 MG/DL (ref 8.3–10.6)
CHLORIDE BLD-SCNC: 101 MMOL/L (ref 99–110)
CO2: 27 MMOL/L (ref 21–32)
CREAT SERPL-MCNC: 0.9 MG/DL (ref 0.6–1.1)
CULTURE, RESPIRATORY: NORMAL
EOSINOPHILS ABSOLUTE: 0 K/UL (ref 0–0.6)
EOSINOPHILS RELATIVE PERCENT: 0 %
GFR AFRICAN AMERICAN: >60
GFR NON-AFRICAN AMERICAN: >60
GLUCOSE BLD-MCNC: 85 MG/DL (ref 70–99)
GRAM STAIN RESULT: NORMAL
HCT VFR BLD CALC: 39.1 % (ref 36–48)
HEMOGLOBIN: 13.6 G/DL (ref 12–16)
LYMPHOCYTES ABSOLUTE: 2.1 K/UL (ref 1–5.1)
LYMPHOCYTES RELATIVE PERCENT: 34.7 %
MAGNESIUM: 2.1 MG/DL (ref 1.8–2.4)
MCH RBC QN AUTO: 34.1 PG (ref 26–34)
MCHC RBC AUTO-ENTMCNC: 34.8 G/DL (ref 31–36)
MCV RBC AUTO: 98.1 FL (ref 80–100)
MONOCYTES ABSOLUTE: 0.4 K/UL (ref 0–1.3)
MONOCYTES RELATIVE PERCENT: 6.8 %
NEUTROPHILS ABSOLUTE: 3.5 K/UL (ref 1.7–7.7)
NEUTROPHILS RELATIVE PERCENT: 58.2 %
PDW BLD-RTO: 13.3 % (ref 12.4–15.4)
PHOSPHORUS: 3.7 MG/DL (ref 2.5–4.9)
PLATELET # BLD: 212 K/UL (ref 135–450)
PMV BLD AUTO: 7.1 FL (ref 5–10.5)
POTASSIUM SERPL-SCNC: 3.7 MMOL/L (ref 3.5–5.1)
PROCALCITONIN: 0.05 NG/ML (ref 0–0.15)
RBC # BLD: 3.98 M/UL (ref 4–5.2)
SODIUM BLD-SCNC: 137 MMOL/L (ref 136–145)
WBC # BLD: 6.1 K/UL (ref 4–11)

## 2021-05-26 PROCEDURE — 85025 COMPLETE CBC W/AUTO DIFF WBC: CPT

## 2021-05-26 PROCEDURE — 6360000002 HC RX W HCPCS: Performed by: STUDENT IN AN ORGANIZED HEALTH CARE EDUCATION/TRAINING PROGRAM

## 2021-05-26 PROCEDURE — 6370000000 HC RX 637 (ALT 250 FOR IP): Performed by: STUDENT IN AN ORGANIZED HEALTH CARE EDUCATION/TRAINING PROGRAM

## 2021-05-26 PROCEDURE — 2060000000 HC ICU INTERMEDIATE R&B

## 2021-05-26 PROCEDURE — 2580000003 HC RX 258: Performed by: STUDENT IN AN ORGANIZED HEALTH CARE EDUCATION/TRAINING PROGRAM

## 2021-05-26 PROCEDURE — 36415 COLL VENOUS BLD VENIPUNCTURE: CPT

## 2021-05-26 PROCEDURE — 6370000000 HC RX 637 (ALT 250 FOR IP): Performed by: INTERNAL MEDICINE

## 2021-05-26 PROCEDURE — 94640 AIRWAY INHALATION TREATMENT: CPT

## 2021-05-26 PROCEDURE — 99232 SBSQ HOSP IP/OBS MODERATE 35: CPT | Performed by: INTERNAL MEDICINE

## 2021-05-26 PROCEDURE — 80069 RENAL FUNCTION PANEL: CPT

## 2021-05-26 PROCEDURE — 6360000002 HC RX W HCPCS: Performed by: INTERNAL MEDICINE

## 2021-05-26 PROCEDURE — 83735 ASSAY OF MAGNESIUM: CPT

## 2021-05-26 PROCEDURE — 84145 PROCALCITONIN (PCT): CPT

## 2021-05-26 PROCEDURE — 86140 C-REACTIVE PROTEIN: CPT

## 2021-05-26 RX ORDER — CODEINE PHOSPHATE AND GUAIFENESIN 10; 100 MG/5ML; MG/5ML
5 SOLUTION ORAL EVERY 6 HOURS PRN
Status: DISCONTINUED | OUTPATIENT
Start: 2021-05-26 | End: 2021-05-27 | Stop reason: HOSPADM

## 2021-05-26 RX ADMIN — IPRATROPIUM BROMIDE AND ALBUTEROL SULFATE 1 AMPULE: .5; 2.5 SOLUTION RESPIRATORY (INHALATION) at 15:52

## 2021-05-26 RX ADMIN — ARFORMOTEROL TARTRATE 15 MCG: 15 SOLUTION RESPIRATORY (INHALATION) at 21:11

## 2021-05-26 RX ADMIN — Medication 10 ML: at 08:25

## 2021-05-26 RX ADMIN — Medication 10 ML: at 20:54

## 2021-05-26 RX ADMIN — ARFORMOTEROL TARTRATE 15 MCG: 15 SOLUTION RESPIRATORY (INHALATION) at 09:29

## 2021-05-26 RX ADMIN — GUAIFENESIN AND CODEINE PHOSPHATE 5 ML: 10; 100 LIQUID ORAL at 12:09

## 2021-05-26 RX ADMIN — IPRATROPIUM BROMIDE AND ALBUTEROL SULFATE 1 AMPULE: .5; 2.5 SOLUTION RESPIRATORY (INHALATION) at 12:51

## 2021-05-26 RX ADMIN — GUAIFENESIN 600 MG: 600 TABLET, EXTENDED RELEASE ORAL at 08:25

## 2021-05-26 RX ADMIN — AZITHROMYCIN MONOHYDRATE 250 MG: 250 TABLET ORAL at 08:25

## 2021-05-26 RX ADMIN — CEFTRIAXONE 1000 MG: 1 INJECTION, POWDER, FOR SOLUTION INTRAMUSCULAR; INTRAVENOUS at 16:22

## 2021-05-26 RX ADMIN — ACETAMINOPHEN 650 MG: 325 TABLET ORAL at 04:56

## 2021-05-26 RX ADMIN — ACETAMINOPHEN 650 MG: 325 TABLET ORAL at 16:22

## 2021-05-26 RX ADMIN — FAMOTIDINE 20 MG: 20 TABLET ORAL at 20:59

## 2021-05-26 RX ADMIN — BUDESONIDE 500 MCG: 0.5 SUSPENSION RESPIRATORY (INHALATION) at 21:11

## 2021-05-26 RX ADMIN — IPRATROPIUM BROMIDE AND ALBUTEROL SULFATE 1 AMPULE: .5; 2.5 SOLUTION RESPIRATORY (INHALATION) at 21:11

## 2021-05-26 RX ADMIN — FAMOTIDINE 20 MG: 20 TABLET ORAL at 08:25

## 2021-05-26 RX ADMIN — BUDESONIDE 500 MCG: 0.5 SUSPENSION RESPIRATORY (INHALATION) at 09:30

## 2021-05-26 RX ADMIN — IPRATROPIUM BROMIDE AND ALBUTEROL SULFATE 1 AMPULE: .5; 2.5 SOLUTION RESPIRATORY (INHALATION) at 09:30

## 2021-05-26 RX ADMIN — ENOXAPARIN SODIUM 40 MG: 40 INJECTION SUBCUTANEOUS at 08:24

## 2021-05-26 RX ADMIN — BENZONATATE 100 MG: 100 CAPSULE ORAL at 04:58

## 2021-05-26 RX ADMIN — BENZONATATE 100 MG: 100 CAPSULE ORAL at 16:22

## 2021-05-26 RX ADMIN — PREDNISONE 40 MG: 20 TABLET ORAL at 08:25

## 2021-05-26 ASSESSMENT — PAIN SCALES - GENERAL
PAINLEVEL_OUTOF10: 0
PAINLEVEL_OUTOF10: 2

## 2021-05-26 ASSESSMENT — PAIN DESCRIPTION - PAIN TYPE: TYPE: ACUTE PAIN

## 2021-05-26 ASSESSMENT — PAIN DESCRIPTION - ORIENTATION: ORIENTATION: ANTERIOR

## 2021-05-26 NOTE — PROGRESS NOTES
Internal Medicine  PGY1  Progress note    CC   Chief Complaint   Patient presents with    Cough     since Wednesday, missed appt for OP CXR on Thursday, thinks she has PNA         History Obtained From:  patient, electronic medical record    Interval Hx:  Alyssa Bazzi. Pt afeb, other vitals stable. RFP/CBC stable, no leukocytosis. SpO2 93 percent on RA. Clinically, still in distress on ambulation. She may have underlying COPD however would need PFTs for that as an outpatient. Day 5 rocephin/Azithro  Day 5 prednisone  She requires home O2 eval screening. She is not medically ready for discharge      HISTORY OF PRESENT ILLNESS:  The patient is a 47 yo female smoker with no known prior history who presented to the ED with 4 days of SOB, fatigue, subjective fever. She reports that symptoms started on Wednesday. She was feeling very tired, increasing productive cough with clear yellow sputum associated with SOB and subjective fever. She had a virtual appointment with her PCP on the next day and was advised to get a CXR for her cough. However, today she felt worse and decided to come to the hospital.     She denies any CP but endorses chest tightness after each bouts of coughing. No sick contact, no recent travel, no N/V, HA, diarrhea. Of note, she did have positive COVID 19 test in 12/2020 when she has minimal symptoms with SOB and dry cough but was in close contact with her sister who had positive COVID 19. During that time she was not required to be hospitalized or on oxygen for COVID 19 infection. She was free of symptoms until now. She does reports that she is a current smoker with 20 pack year history. She has not been smoking the past few days because of cough and shortness of breath. In the ED, she was found to be mildly hypoxic and was placed on 1L of oxygen satting in the upper 90s. Other labs were unremarkable. CTPA in the ED ruled out PE but does shows multifocal bilateral opacities concerning for pneumonia. She was given Azithromycin, Rocephin, 1 dose of steroid, and breathing treatment. Past Medical History:    History reviewed. No pertinent past medical history. Past Surgical History:    History reviewed. No pertinent surgical history. Medications Priorto Admission:    No medications prior to admission. Allergies:  Patient has no known allergies. Social History:   · TOBACCO:   reports that she has been smoking. She has been smoking about 0.50 packs per day. She has never used smokeless tobacco.  · ETOH:   reports no history of alcohol use. · DRUGS : denies   · Patient currently lives with family at home  ·   Family History:   Family history of DM (father)      Physical exam:        Physical Exam  Vitals and nursing note reviewed. Constitutional:       General: She is not in acute distress. Appearance: Normal appearance. She is well-developed. She is obese. She is not ill-appearing, toxic-appearing or diaphoretic. HENT:      Head: Normocephalic and atraumatic. Eyes:      General: No scleral icterus. Conjunctiva/sclera: Conjunctivae normal.      Pupils: Pupils are equal, round, and reactive to light. Cardiovascular:      Rate and Rhythm: Regular rhythm. present. normal rate and rhythm      Heart sounds: Normal heart sounds. No murmur heard. No friction rub. No gallop. Pulmonary:      Effort: Pulmonary effort is normal. No respiratory distress. Breath sounds: Wheezing present. No rales. Chest:      Chest wall: No tenderness. Abdominal:      General: Bowel sounds are normal. There is no distension. Palpations: Abdomen is soft. Tenderness: There is no abdominal tenderness. Musculoskeletal:         General: No swelling, tenderness or deformity. Normal range of motion. Cervical back: Normal range of motion and neck supple. Skin:     General: Skin is warm and dry. Neurological:      General: No focal deficit present.       Mental Status: She is alert and oriented to person, place, and time. Cranial Nerves: No cranial nerve deficit. Sensory: No sensory deficit. Psychiatric:         Behavior: Behavior normal.        Vitals:    05/26/21 0817   BP: (!) 144/91   Pulse: 78   Resp: 16   Temp: 97 °F (36.1 °C)   SpO2: 95%       DATA:    Labs:  CBC:   Recent Labs     05/24/21  0501 05/25/21  0528 05/26/21  0514   WBC 5.6 5.3 6.1   HGB 14.5 14.2 13.6   HCT 42.1 40.8 39.1    195 212       BMP:   Recent Labs     05/24/21  0501 05/25/21  0528 05/26/21  0514    140 137   K 3.8 4.0 3.7    104 101   CO2 27 29 27   BUN 13 19 17   CREATININE 0.7 0.9 0.9   GLUCOSE 106* 91 85   PHOS  --  3.9 3.7     LFT's: No results for input(s): AST, ALT, ALB, BILITOT, ALKPHOS in the last 72 hours. Troponin: No results for input(s): TROPONINI in the last 72 hours. BNP:No results for input(s): BNP in the last 72 hours. ABGs: No results for input(s): PHART, SQH3SVH, PO2ART in the last 72 hours. INR: No results for input(s): INR in the last 72 hours. U/A:No results for input(s): NITRITE, COLORU, PHUR, LABCAST, WBCUA, RBCUA, MUCUS, TRICHOMONAS, YEAST, BACTERIA, CLARITYU, SPECGRAV, LEUKOCYTESUR, UROBILINOGEN, BILIRUBINUR, BLOODU, GLUCOSEU, AMORPHOUS in the last 72 hours. Invalid input(s): KETONESU    XR CHEST (2 VW)   Final Result   1. No acute disease. CT CHEST PULMONARY EMBOLISM W CONTRAST   Final Result      1. No evidence of pulmonary thromboembolism. No acute vascular abnormality. 2. Patchy areas of branching micronodular opacities bilaterally involving all lobes suggesting bronchiolitis versus early bilateral multifocal bronchopneumonia. XR CHEST (2 VW)   Final Result   1. No acute disease. ASSESSMENT AND PLAN:  46year old female smoker with no know past medical history presented with worsening SOB, productive cough, fever and admitted for the concern of PNA and exacerbation of underlying obstructive disease.      Acute hypoxic respiratory failure 2/2 Pneumonia  Multifocal bronchopneumonia   Duo nebs  Continue antibiotics  LABA/ICS at discharge  Home O2 eval screening    Multifocal pneumonia   Management with Abx as above     Suspected COPD exacerbation   Given her long history of smoking and now presented with productive cough and has expiratory wheezes. Management as above  Counseled on smoking cessation  Needs outpatient PFTs  Home O2 eval screening    History of COVID 19   COVID 19 positive in 12/2020 but with minimal symptoms including SOB and dry cough. Did not have to be hospitalized or on oxygen. Sx resolved after several days.    COVID 19 test resent which is negative    Tobacco use   History of 20 pack year smoking history   Current smoking   Nicotine patch   Counseled on smoking cessation     Obesity   BMI of 30.6  Counseled patient on weight loss       Will discuss with attending physician Dr. Antonio Duarte Status:Full code  FEN: Regular diet   PPX: Lovenox  DISPO: IP, medically not ready for Kirk Ames MD, PGY 1  Internal Medicine   Please contact via [employer]   5/26/2021,  8:18 AM

## 2021-05-26 NOTE — PROGRESS NOTES
RESPIRATORY THERAPY ASSESSMENT    Name:  Hemant Record Number:  [id number]  Age: 46 y.o. Gender: female  : 1970  Today's Date:  2021  Room:  [address] [place] [address]-    Assessment     Is the patient being admitted for a COPD or Asthma exacerbation? No   (If yes the patient will be seen every 4 hours for the first 24 hours and then reassessed)    Patient Admission Diagnosis  pneumonia      Allergies  No Known Allergies      Pulmonary History:smoker  Home Oxygen Therapy:  room air  Home Respiratory Therapy:None   Current Respiratory Therapy:  Duo, brov and pulm  Treatment Type: HHN  Medications: Albuterol/Ipratropium    Respiratory Severity Index(RSI)   Patients with orders for inhalation medications, oxygen, or any therapeutic treatment modality will be placed on Respiratory Protocol. They will be assessed with the first treatment and at least every 72 hours thereafter. The following severity scale will be used to determine frequency of treatment intervention. Smoking History: Pulmonary Disease or Smoking History, Greater than 15 pack year = 2    Social History  Social History     Tobacco Use    Smoking status: Current Every Day Smoker     Packs/day: 0.50    Smokeless tobacco: Never Used   Vaping Use    Vaping Use: Never used   Substance Use Topics    Alcohol use: No    Drug use: No       Recent Surgical History: None = 0  Past Surgical History  History reviewed. No pertinent surgical history.     Level of Consciousness: Alert, Oriented, and Cooperative = 0    Level of Activity: Walking unassisted = 0    Respiratory Pattern: Regular Pattern; RR 8-20 = 0    Breath Sounds: Diminshed bilaterally and/or crackles = 2    Sputum  Sputum Color: None, Tenacity: None, Sputum How Obtained: Spontaneous cough  Cough: Strong, spontaneous, non-productive = 0    Vital Signs   BP (!) 144/91   Pulse 78   Temp 97 °F (36.1 °C) (Oral)   Resp 16   Ht 5' 6\" (1.676 m)   Wt 196 lb 12.8 oz (89.3 kg) SpO2 95%   BMI 31.76 kg/m²   SPO2 (COPD values may differ): Greater than or equal to 92% on room air = 0    Peak Flow (asthma only): not applicable = 0    RSI: 0-4 = See once and convert to home regimen or discontinue        Plan       Goals: medication delivery, mobilize retained secretions, volume expansion and improve oxygenation    Patient/caregiver was educated on the proper method of use for Respiratory Care Devices:  Yes      Level of patient/caregiver understanding able to:   ? Verbalize understanding   ? Demonstrate understanding       ? Teach back        ? Needs reinforcement       ? No available caregiver               ? Other:     Response to education:  Very Good     Is patient being placed on Home Treatment Regimen? No     Does the patient have everything they need prior to discharge? NA     Comments: reviewed chart and assessed patient     Plan of Care: Maintain present reg, per pt. Request     Electronically signed by Timothy Esparza RCP on 5/26/2021 at 9:38 AM    Respiratory Protocol Guidelines     1. Assessment and treatment by Respiratory Therapy will be initiated for medication and therapeutic interventions upon initiation of aerosolized medication. 2. Physician will be contacted for respiratory rate (RR) greater than 35 breaths per minute. Therapy will be held for heart rate (HR) greater than 140 beats per minute, pending direction from physician. 3. Bronchodilators will be administered via Metered Dose Inhaler (MDI) with spacer when the following criteria are met:  a. Alert and cooperative     b. HR < 140 bpm  c. RR < 30 bpm                d. Can demonstrate a 2-3 second inspiratory hold  4. Bronchodilators will be administered via Hand Held Nebulizer [facility]) to patients when ANY of the following criteria are met  a. Incognizant or uncooperative          b. Patients treated with HHN at Home        c.  Unable to demonstrate proper use of MDI with spacer     d. RR > 30 bpm 5. Bronchodilators will be delivered via Metered Dose Inhaler (MDI), HHN, Aerogen to intubated patients on mechanical ventilation. 6. Inhalation medication orders will be delivered and/or substituted as outlined below. Aerosolized Medications Ordering and Administration Guidelines:    1. All Medications will be ordered by a physician, and their frequency and/or modality will be adjusted as defined by the patients Respiratory Severity Index (RSI) score. 2. If the patient does not have documented COPD, consider discontinuing anticholinergics when RSI is less than 9.  3. If the bronchospasm worsens (increased RSI), then the bronchodilator frequency can be increased to a maximum of every 4 hours. If greater than every 4 hours is required, the physician will be contacted. 4. If the bronchospasm improves, the frequency of the bronchodilator can be decreased, based on the patient's RSI, but not less than home treatment regimen frequency. 5. Bronchodilator(s) will be discontinued if patient has a RSI less than 9 and has received no scheduled or as needed treatment for 72  Hrs. Patients Ordered on a Mucolytic Agent:    1. Must always be administered with a bronchodilator. 2. Discontinue if patient experiences worsened bronchospasm, or secretions have lessened to the point that the patient is able to clear them with a cough. Anti-inflammatory and Combination Medications:    1. If the patient lacks prior history of lung disease, is not using inhaled anti-inflammatory medication at home, and lacks wheezing by examination or by history for at least 24 hours, contact physician for possible discontinuation.

## 2021-05-26 NOTE — PROGRESS NOTES
Pulmonary & Critical Care Medicine    Admit Date: 2021  PCP: Mau Subramanian MD    CC:  Pneumonia   Events of Last 24 hours:   Wheezing is better, continue to have coughing spells. No fever or chills. Vitals:  Tmax:  VITALS:  BP (!) 152/109   Pulse 87   Temp 98 °F (36.7 °C) (Oral)   Resp 18   Ht 5' 6\" (1.676 m)   Wt 196 lb 12.8 oz (89.3 kg)   SpO2 92%   BMI 31.76 kg/m²   24HR INTAKE/OUTPUT:      Intake/Output Summary (Last 24 hours) at 2021 1132  Last data filed at 2021 1059  Gross per 24 hour   Intake 600 ml   Output --   Net 600 ml     CURRENT PULSE OXIMETRY:  SpO2: 92 %  24HR PULSE OXIMETRY RANGE:  SpO2  Av.6 %  Min: 92 %  Max: 98 %    EXAM:  General: No distress. Alert  Eyes: PERRL. No sclera icterus. No conjunctival injection. ENT: No discharge. Moist oral mucosa   Neck: Trachea midline. Neck is supple   Resp: No accessory muscle use. Bilateral wheezing   CV: Regular rate. Regular rhythm. No mumur or rub. No edema. GI: Non-tender. Non-distended. Normal bowel sounds. Neuro: Awake. Speech is clear. Psych: No anxiety or agitation.      Medications:    IV:   sodium chloride           Scheduled Meds:   cefTRIAXone (ROCEPHIN) IV  1,000 mg Intravenous Q24H    azithromycin  250 mg Oral Daily    ipratropium-albuterol  1 ampule Nebulization Q4H WA    enoxaparin  40 mg Subcutaneous Daily    Arformoterol Tartrate  15 mcg Nebulization BID    budesonide  0.5 mg Nebulization BID    sodium chloride flush  5-40 mL Intravenous 2 times per day    famotidine  20 mg Oral BID    nicotine  1 patch Transdermal Daily         Diet: DIET GENERAL;     Results:  CBC:   Recent Labs     21  0501 21  0521  0514   WBC 5.6 5.3 6.1   HGB 14.5 14.2 13.6   HCT 42.1 40.8 39.1   MCV 98.5 98.7 98.1    195 212     BMP:   Recent Labs     21  0501 2128 21  0514    140 137   K 3.8 4.0 3.7    104 101   CO2 27 29 27   PHOS  --  3.9 3.7   BUN 13 19 17   CREATININE 0.7 0.9 0.9     LIVER PROFILE: No results for input(s): AST, ALT, LIPASE, BILIDIR, BILITOT, ALKPHOS in the last 72 hours. Invalid input(s): AMYLASE,  ALB  PT/INR: No results for input(s): PROTIME, INR in the last 72 hours. APTT: No results for input(s): APTT in the last 72 hours. UA:No results for input(s): NITRITE, COLORU, PHUR, LABCAST, WBCUA, RBCUA, MUCUS, TRICHOMONAS, YEAST, BACTERIA, CLARITYU, SPECGRAV, LEUKOCYTESUR, UROBILINOGEN, BILIRUBINUR, BLOODU, GLUCOSEU, AMORPHOUS in the last 72 hours. Invalid input(s): KETONESU      Assessment/Plan:  46 y.o. female with     Multifocal bronchopneumonia   Reactive airway disease. ?COPD exacerbation vs asthma. No prior PFT   Smoker  Incontinence due to chronic cough     Continue azithromycin and ceftriaxone while inpatient. Switch to levaquin on discharge. Continue prednisone 40mg daily for 5 days. Continue brovana and pulmicort while inpatient. Switch to LABA/ICS on discharge.     Start codeine cough syrup         Peter Dodd MD, MD

## 2021-05-26 NOTE — PLAN OF CARE
Problem: Airway Clearance - Ineffective:  Goal: Ability to maintain a clear airway will improve  Description: Ability to maintain a clear airway will improve  Outcome: Ongoing  Note: Pt able to clear airway effectively. PT noted to have very wet/congested cough. PT able to expectorate contents at times. Pt has SOB when coughing and ambulating. PT has Oxygen saturations >90% on room air.      Problem: OXYGENATION/RESPIRATORY FUNCTION  Goal: Patient will maintain patent airway  Outcome: Ongoing

## 2021-05-26 NOTE — PROGRESS NOTES
[facility]    Respiratory Therapy     Home Oxygen Evaluation        Name: Hemant Record Number: [id number]  Age: 46 y.o. Gender:  female   : 1970  Today's date: 2021  Room: [address] [place] [address]      Assessment        BP (!) 152/109   Pulse 87   Temp 98 °F (36.7 °C) (Oral)   Resp 22   Ht 5' 6\" (1.676 m)   Wt 196 lb 12.8 oz (89.3 kg)   SpO2 94%   BMI 31.76 kg/m²     Patient Active Problem List   Diagnosis    Acute respiratory failure with hypoxia (HCC)       Social History:  Social History     Tobacco Use    Smoking status: Current Every Day Smoker     Packs/day: 0.50    Smokeless tobacco: Never Used   Vaping Use    Vaping Use: Never used   Substance Use Topics    Alcohol use: No    Drug use: No       Patient Room Air saturation at rest 94  %  Patient Room Air saturation upon ambulation 92 %    does not require home oxygen    Patient resting on 0  lmp  with an oxygen saturation of  94 %     Patient ambulated on 0 lpm with an oxygen saturation of 92%        In your clinical assessment does the Patient Require Portable Oxygen Tanks?     No:                Patient/caregiver was educated on Home Oxygen process:  Yes      Level of patient/caregiver understanding able to:   [x] Verbalize understanding   [] Demonstrate understanding       [] Teach back        [] Needs reinforcement        []  No available caregiver               []  Other:     Response to education:  Very Good     Time Spent with Home O2 Set Up:  10  minutes     Mikey Randhawa RCP on 2021 at 2:04 PM                 .

## 2021-05-27 VITALS
TEMPERATURE: 97 F | HEART RATE: 78 BPM | SYSTOLIC BLOOD PRESSURE: 149 MMHG | BODY MASS INDEX: 31.63 KG/M2 | HEIGHT: 66 IN | WEIGHT: 196.8 LBS | RESPIRATION RATE: 18 BRPM | OXYGEN SATURATION: 95 % | DIASTOLIC BLOOD PRESSURE: 91 MMHG

## 2021-05-27 LAB
ALBUMIN SERPL-MCNC: 3.8 G/DL (ref 3.4–5)
ANION GAP SERPL CALCULATED.3IONS-SCNC: 10 MMOL/L (ref 3–16)
BASOPHILS ABSOLUTE: 0 K/UL (ref 0–0.2)
BASOPHILS RELATIVE PERCENT: 0.2 %
BUN BLDV-MCNC: 19 MG/DL (ref 7–20)
CALCIUM SERPL-MCNC: 9.6 MG/DL (ref 8.3–10.6)
CHLORIDE BLD-SCNC: 104 MMOL/L (ref 99–110)
CO2: 26 MMOL/L (ref 21–32)
CREAT SERPL-MCNC: 0.7 MG/DL (ref 0.6–1.1)
EOSINOPHILS ABSOLUTE: 0 K/UL (ref 0–0.6)
EOSINOPHILS RELATIVE PERCENT: 0.2 %
GFR AFRICAN AMERICAN: >60
GFR NON-AFRICAN AMERICAN: >60
GLUCOSE BLD-MCNC: 90 MG/DL (ref 70–99)
HCT VFR BLD CALC: 39.7 % (ref 36–48)
HEMOGLOBIN: 13.4 G/DL (ref 12–16)
LYMPHOCYTES ABSOLUTE: 2.3 K/UL (ref 1–5.1)
LYMPHOCYTES RELATIVE PERCENT: 40 %
MAGNESIUM: 2.3 MG/DL (ref 1.8–2.4)
MCH RBC QN AUTO: 33.3 PG (ref 26–34)
MCHC RBC AUTO-ENTMCNC: 33.9 G/DL (ref 31–36)
MCV RBC AUTO: 98.3 FL (ref 80–100)
MONOCYTES ABSOLUTE: 0.4 K/UL (ref 0–1.3)
MONOCYTES RELATIVE PERCENT: 7.6 %
NEUTROPHILS ABSOLUTE: 3 K/UL (ref 1.7–7.7)
NEUTROPHILS RELATIVE PERCENT: 52 %
PDW BLD-RTO: 13.3 % (ref 12.4–15.4)
PHOSPHORUS: 4.3 MG/DL (ref 2.5–4.9)
PLATELET # BLD: 241 K/UL (ref 135–450)
PMV BLD AUTO: 6.8 FL (ref 5–10.5)
POTASSIUM SERPL-SCNC: 3.9 MMOL/L (ref 3.5–5.1)
RBC # BLD: 4.04 M/UL (ref 4–5.2)
SEDIMENTATION RATE, ERYTHROCYTE: 15 MM/HR (ref 0–30)
SODIUM BLD-SCNC: 140 MMOL/L (ref 136–145)
WBC # BLD: 5.8 K/UL (ref 4–11)

## 2021-05-27 PROCEDURE — 2580000003 HC RX 258: Performed by: STUDENT IN AN ORGANIZED HEALTH CARE EDUCATION/TRAINING PROGRAM

## 2021-05-27 PROCEDURE — 80069 RENAL FUNCTION PANEL: CPT

## 2021-05-27 PROCEDURE — 6370000000 HC RX 637 (ALT 250 FOR IP): Performed by: STUDENT IN AN ORGANIZED HEALTH CARE EDUCATION/TRAINING PROGRAM

## 2021-05-27 PROCEDURE — 6370000000 HC RX 637 (ALT 250 FOR IP): Performed by: INTERNAL MEDICINE

## 2021-05-27 PROCEDURE — 85652 RBC SED RATE AUTOMATED: CPT

## 2021-05-27 PROCEDURE — 36415 COLL VENOUS BLD VENIPUNCTURE: CPT

## 2021-05-27 PROCEDURE — 6360000002 HC RX W HCPCS: Performed by: INTERNAL MEDICINE

## 2021-05-27 PROCEDURE — 83735 ASSAY OF MAGNESIUM: CPT

## 2021-05-27 PROCEDURE — 94640 AIRWAY INHALATION TREATMENT: CPT

## 2021-05-27 PROCEDURE — 85025 COMPLETE CBC W/AUTO DIFF WBC: CPT

## 2021-05-27 PROCEDURE — 6360000002 HC RX W HCPCS: Performed by: STUDENT IN AN ORGANIZED HEALTH CARE EDUCATION/TRAINING PROGRAM

## 2021-05-27 RX ORDER — IPRATROPIUM BROMIDE AND ALBUTEROL SULFATE 2.5; .5 MG/3ML; MG/3ML
3 SOLUTION RESPIRATORY (INHALATION) EVERY 4 HOURS PRN
Qty: 360 ML | Refills: 0 | Status: SHIPPED | OUTPATIENT
Start: 2021-05-27

## 2021-05-27 RX ORDER — BUDESONIDE AND FORMOTEROL FUMARATE DIHYDRATE 160; 4.5 UG/1; UG/1
2 AEROSOL RESPIRATORY (INHALATION) 2 TIMES DAILY
Qty: 1 INHALER | Refills: 5 | Status: SHIPPED | OUTPATIENT
Start: 2021-05-27 | End: 2021-06-29 | Stop reason: SDUPTHER

## 2021-05-27 RX ORDER — LEVOFLOXACIN 750 MG/1
750 TABLET ORAL DAILY
Qty: 7 TABLET | Refills: 0 | Status: SHIPPED | OUTPATIENT
Start: 2021-05-27 | End: 2021-06-03

## 2021-05-27 RX ORDER — BENZONATATE 100 MG/1
100 CAPSULE ORAL 3 TIMES DAILY PRN
Qty: 30 CAPSULE | Refills: 0 | Status: SHIPPED | OUTPATIENT
Start: 2021-05-27 | End: 2021-06-06

## 2021-05-27 RX ORDER — ALBUTEROL SULFATE 90 UG/1
2 AEROSOL, METERED RESPIRATORY (INHALATION) EVERY 4 HOURS PRN
Qty: 1 INHALER | Refills: 1 | Status: SHIPPED | OUTPATIENT
Start: 2021-05-27 | End: 2021-06-29 | Stop reason: SDUPTHER

## 2021-05-27 RX ORDER — PREDNISONE 20 MG/1
TABLET ORAL
Qty: 15 TABLET | Refills: 0 | Status: SHIPPED | OUTPATIENT
Start: 2021-05-27 | End: 2021-06-08

## 2021-05-27 RX ADMIN — AZITHROMYCIN MONOHYDRATE 250 MG: 250 TABLET ORAL at 08:03

## 2021-05-27 RX ADMIN — ARFORMOTEROL TARTRATE 15 MCG: 15 SOLUTION RESPIRATORY (INHALATION) at 08:45

## 2021-05-27 RX ADMIN — BUDESONIDE 500 MCG: 0.5 SUSPENSION RESPIRATORY (INHALATION) at 08:45

## 2021-05-27 RX ADMIN — IPRATROPIUM BROMIDE AND ALBUTEROL SULFATE 1 AMPULE: .5; 2.5 SOLUTION RESPIRATORY (INHALATION) at 12:05

## 2021-05-27 RX ADMIN — ENOXAPARIN SODIUM 40 MG: 40 INJECTION SUBCUTANEOUS at 08:03

## 2021-05-27 RX ADMIN — GUAIFENESIN AND CODEINE PHOSPHATE 5 ML: 10; 100 LIQUID ORAL at 05:38

## 2021-05-27 RX ADMIN — BENZONATATE 100 MG: 100 CAPSULE ORAL at 08:03

## 2021-05-27 RX ADMIN — Medication 10 ML: at 08:04

## 2021-05-27 RX ADMIN — FAMOTIDINE 20 MG: 20 TABLET ORAL at 08:03

## 2021-05-27 RX ADMIN — IPRATROPIUM BROMIDE AND ALBUTEROL SULFATE 1 AMPULE: .5; 2.5 SOLUTION RESPIRATORY (INHALATION) at 08:46

## 2021-05-27 ASSESSMENT — PAIN SCALES - GENERAL
PAINLEVEL_OUTOF10: 0

## 2021-05-27 NOTE — DISCHARGE SUMMARY
INTERNAL MEDICINE DEPARTMENT AT [address] [place] [address]  DISCHARGE SUMMARY     Patient ID: Isabell Canas                                             Discharge Date: 5/27/2021     Patient's PCP: Felipe Valerio MD                                          Discharge Physician: Yanelis So MD MD  Admit Date: 5/22/2021   Admitting Physician: Philip Garrett DO     PROBLEMS DURING HOSPITALIZATION:      Patient Active Problem List   Diagnosis    Acute respiratory failure with hypoxia ([address]         DISCHARGE DIAGNOSES:  -Acute hypoxic respiratory failure 2/2 Multifocal bronchopneumonia (resolved)  -Multifocal pneumonia   -Suspected COPD exacerbation   -History of COVID 19   -Tobacco use   -Obesity      Hospital Course:    46year-old female with a past medical history of smoking presented to the hospital with dyspnea and subjective fevers.  She also had increased cough and yellow/greenish sputum production which was increased from baseline.  She was also found to be hypoxic in the ED. Aguila Suero was diagnosed with acute hypoxic respiratory failure secondary to multifocal bronchopneumonia and started on antibiotics and breathing treatments.  Pulmonology was also consulted.  After receiving treatment, her condition significantly improved with notable decrease in her dyspnea, cough and sputum production.  Given her smoking history and decreased breath sounds she might also have underlying COPD however since she did not have prior PFTs.  She was educated on the importance of quitting smoking and following up with a pulmonologist for outpatient PFTs.  She also had a home oxygen eval on which she did well and did not seem to require any  oxygen.  After stabilization of her clinical condition she was discharged home on antibiotics and LABA/ICS with instructions to follow-up with her PCP and pulmonologist in 1 week.  She expressed understanding of the assessment and plan and was in agreement.        Physical Exam:  BP (!) 149/91   Pulse acute disease.          Disposition: home  Discharged Condition: Stable  Follow Up: Primary Care Physician in one week, pulmonology in one week     DISCHARGE MEDICATION:      Medication List       START taking these medications    albuterol sulfate  (90 Base) MCG/ACT inhaler  Commonly known as: Ventolin HFA  Inhale 2 puffs into the lungs 4 times daily as needed for Wheezing      benzonatate 100 MG capsule  Commonly known as: Tessalon Perles  Take 1 capsule by mouth 3 times daily as needed for Cough              Where to Get Your Medications       You can get these medications from any pharmacy    Bring a paper prescription for each of these medications  · albuterol sulfate  (90 Base) MCG/ACT inhaler  · benzonatate 100 MG capsule         Activity: activity as tolerated  Diet: regular diet  Wound Care: none needed     Time Spent on discharge is more than 30 minutes     Signed:  Paula Aguilar MD,  MD             5/27/2021

## 2021-05-27 NOTE — PROGRESS NOTES
Discharge instructions given to patient at this time. Pt has HHN at bedside to take home. Pt awaiting ride for discharge.

## 2021-05-27 NOTE — PROGRESS NOTES
Patient in bed resting comfortably. Respirations steady and unlabored. No signs of respiratory or cardiac distress. No complaints of pain at this time. No needs at this time. Call light in reach. Will continue to monitor.   Electronically signed by Lea Pena RN on 5/27/2021 at 2:30 AM

## 2021-05-27 NOTE — CARE COORDINATION
Contacted [employer] [employer] for home nebulizer to deliver before leaving hospital today. CM will continue to follow patient until discharge. Smoking Cessation info on [facility]. PO abx on discharge.  Electronically signed by Frederic Roberson RN on 5/27/2021 at 11:24 AM

## 2021-05-27 NOTE — PLAN OF CARE
Patient able to maintain a patent airway at this time. Pt free from falls this shift. Fall precautions in place at all times. Call light always within reach. Pt able and agreeable to contact for safety appropriately. Pain/discomfort being managed with PRN analgesics per MD orders. Pt able to express presence and absence of pain and rate pain appropriately using numerical scale.

## 2021-05-27 NOTE — PROGRESS NOTES
INTERNAL MEDICINE DEPARTMENT AT [address] [place] [address]  DISCHARGE SUMMARY    Patient ID: Devin Randle                                             Discharge Date: 5/27/2021   Patient's PCP: Kary Abrams MD                                          Discharge Physician: Zna Yadav MD MD  Admit Date: 5/22/2021   Admitting Physician: Genny Boucher DO    PROBLEMS DURING HOSPITALIZATION:  Patient Active Problem List   Diagnosis    Acute respiratory failure with hypoxia ([address]       DISCHARGE DIAGNOSES:  -Acute hypoxic respiratory failure 2/2 Multifocal bronchopneumonia (resolved)  -Multifocal pneumonia   -Suspected COPD exacerbation   -History of COVID 19   -Tobacco use   -Obesity     Hospital Course:    46year-old female with a past medical history of smoking presented to the hospital with dyspnea and subjective fevers.  She also had increased cough and yellow/greenish sputum production which was increased from baseline.  She was also found to be hypoxic in the ED. Wolfgang Reyes was diagnosed with acute hypoxic respiratory failure secondary to multifocal bronchopneumonia and started on antibiotics and breathing treatments.  Pulmonology was also consulted.  After receiving treatment, her condition significantly improved with notable decrease in her dyspnea, cough and sputum production.  Given her smoking history and decreased breath sounds she might also have underlying COPD however since she did not have prior PFTs. She was educated on the importance of quitting smoking and following up with a pulmonologist for outpatient PFTs.  She also had a home oxygen eval on which she did well and did not seem to require any  oxygen.  After stabilization of her clinical condition she was discharged home on antibiotics and LABA/ICS with instructions to follow-up with her PCP and pulmonologist in 1 week.  She expressed understanding of the assessment and plan and was in agreement.       Physical Exam:  BP (!) 149/91   Pulse 78   Temp 97 °F (36.1 °C) (Oral)   Resp 18   Ht 5' 6\" (1.676 m)   Wt 196 lb 12.8 oz (89.3 kg)   SpO2 95%   BMI 31.76 kg/m²   Constitutional:       General: She is not in acute distress. Appearance: Normal appearance. She is well-developed. She is obese. She is not ill-appearing, toxic-appearing or diaphoretic. HENT:      Head: Normocephalic and atraumatic. Eyes:      General: No scleral icterus. Conjunctiva/sclera: Conjunctivae normal.      Pupils: Pupils are equal, round, and reactive to light. Cardiovascular:      Rate and Rhythm: Regular rhythm. present. normal rate and rhythm      Heart sounds: Normal heart sounds. No murmur heard. No friction rub. No gallop. Pulmonary:      Effort: Pulmonary effort is normal. No respiratory distress. Breath sounds: Wheezing present. No rales. Chest:      Chest wall: No tenderness. Abdominal:      General: Bowel sounds are normal. There is no distension. Palpations: Abdomen is soft. Tenderness: There is no abdominal tenderness. Musculoskeletal:         General: No swelling, tenderness or deformity. Normal range of motion. Cervical back: Normal range of motion and neck supple. Skin:     General: Skin is warm and dry. Neurological:      General: No focal deficit present. Mental Status: She is alert and oriented to person, place, and time. Cranial Nerves: No cranial nerve deficit. Sensory: No sensory deficit. Psychiatric:         Behavior: Behavior normal.     Consults: pulmonary  Significant Diagnostic Studies:   XR CHEST (2 VW)   Final Result   1. No acute disease. CT CHEST PULMONARY EMBOLISM W CONTRAST   Final Result      1. No evidence of pulmonary thromboembolism. No acute vascular abnormality. 2. Patchy areas of branching micronodular opacities bilaterally involving all lobes suggesting bronchiolitis versus early bilateral multifocal bronchopneumonia. XR CHEST (2 VW)   Final Result   1. No acute disease.

## 2021-06-10 ENCOUNTER — TELEPHONE (OUTPATIENT)
Dept: PULMONOLOGY | Age: 51
End: 2021-06-10

## 2021-06-10 NOTE — TELEPHONE ENCOUNTER
Patient has scheduled a hospital follow up appt at the end of this month.  Your note at the hospital mentions pt have outpatient PFTs, do you want these done before she comes in or would you like to see her first?    Thank you

## 2021-06-29 ENCOUNTER — OFFICE VISIT (OUTPATIENT)
Dept: PULMONOLOGY | Age: 51
End: 2021-06-29
Payer: COMMERCIAL

## 2021-06-29 VITALS
HEART RATE: 101 BPM | WEIGHT: 206 LBS | OXYGEN SATURATION: 95 % | BODY MASS INDEX: 33.11 KG/M2 | SYSTOLIC BLOOD PRESSURE: 118 MMHG | HEIGHT: 66 IN | TEMPERATURE: 98 F | DIASTOLIC BLOOD PRESSURE: 76 MMHG

## 2021-06-29 DIAGNOSIS — J44.9 CHRONIC OBSTRUCTIVE PULMONARY DISEASE, UNSPECIFIED COPD TYPE (HCC): Primary | ICD-10-CM

## 2021-06-29 PROCEDURE — 99214 OFFICE O/P EST MOD 30 MIN: CPT | Performed by: INTERNAL MEDICINE

## 2021-06-29 RX ORDER — ALBUTEROL SULFATE 90 UG/1
2 AEROSOL, METERED RESPIRATORY (INHALATION) EVERY 4 HOURS PRN
Qty: 1 INHALER | Refills: 3 | Status: SHIPPED | OUTPATIENT
Start: 2021-06-29

## 2021-06-29 RX ORDER — BUDESONIDE AND FORMOTEROL FUMARATE DIHYDRATE 160; 4.5 UG/1; UG/1
2 AEROSOL RESPIRATORY (INHALATION) 2 TIMES DAILY
Qty: 1 INHALER | Refills: 5 | Status: SHIPPED | OUTPATIENT
Start: 2021-06-29

## 2021-06-29 ASSESSMENT — ENCOUNTER SYMPTOMS
WHEEZING: 0
SHORTNESS OF BREATH: 1
CHEST TIGHTNESS: 0
COUGH: 0

## 2021-06-29 NOTE — PROGRESS NOTES
[facility] Pulmonary and Critical Care    Outpatient Note    Subjective:   CHIEF COMPLAINT: No chief complaint on file. HPI:     The patient is 46 y.o. female who presents today for a hospital follow up. She was admitted to the hospital on 5/22/21 with bronchopneumonia, wheezing and hypoxia. Clinically findings were consistent with COPD exacerbation. She was treated with ABX, steroids, breathing treatment and O2. She was discharged on 5/27/21    She is back to work now, however she has difficulty wearing a mask due to the heat, as it makes her SOB worse. Apart from work her breathing is fine. Occasional cough, nothing coming up. She is currently on symbicort which she use on PRN bases and albuterol. She is still smoking. She is asking to fill [place] paperwork. Past Medical History:    No past medical history on file. Social History:    Social History     Tobacco Use   Smoking Status Current Every Day Smoker    Packs/day: 0.50   Smokeless Tobacco Never Used       Family History:  No family history on file. Current Medications:  Current Outpatient Medications on File Prior to Visit   Medication Sig Dispense Refill    ipratropium-albuterol (DUONEB) 0.5-2.5 (3) MG/3ML SOLN nebulizer solution Take 3 mLs by nebulization every 4 hours as needed for Shortness of Breath 360 mL 0     No current facility-administered medications on file prior to visit. REVIEW OF SYSTEMS:    Review of Systems   Constitutional: Negative for chills, fatigue, fever and unexpected weight change. HENT: Negative for congestion. Respiratory: Positive for shortness of breath (with hot enviroments ). Negative for cough, chest tightness and wheezing. Cardiovascular: Negative for chest pain, palpitations and leg swelling. Neurological: Negative for weakness. Psychiatric/Behavioral: The patient is not nervous/anxious. All other systems reviewed and are negative.         Objective:   PHYSICAL EXAM: VITALS:  /76 (Site: Right Upper Arm, Position: Sitting, Cuff Size: Medium Adult)   Pulse 101   Temp 98 °F (36.7 °C) (Infrared)   Ht 5' 6\" (1.676 m)   Wt 206 lb (93.4 kg)   SpO2 95%   Breastfeeding No   BMI 33.25 kg/m²     Physical Exam  Vitals reviewed. Constitutional:       Appearance: She is well-developed. HENT:      Head: Normocephalic and atraumatic. Eyes:      Pupils: Pupils are equal, round, and reactive to light. Neck:      Vascular: No JVD. Cardiovascular:      Rate and Rhythm: Normal rate and regular rhythm. Heart sounds: No murmur heard. Pulmonary:      Effort: Pulmonary effort is normal. No respiratory distress. Breath sounds: Normal breath sounds. No stridor. No wheezing or rales. Abdominal:      General: Bowel sounds are normal.      Palpations: Abdomen is soft. Musculoskeletal:         General: No deformity. Cervical back: Neck supple. Right lower leg: No edema. Left lower leg: No edema. Skin:     General: Skin is warm and dry. Neurological:      Mental Status: She is alert and oriented to person, place, and time. Psychiatric:         Behavior: Behavior normal.         DATA:    CT chest on 5/22/21      1. No evidence of pulmonary thromboembolism. No acute vascular abnormality. 2. Patchy areas of branching micronodular opacities bilaterally involving all lobes suggesting bronchiolitis versus early bilateral multifocal bronchopneumonia. Assessment:      Diagnosis Orders   1. Chronic obstructive pulmonary disease, unspecified COPD type ([address]  Full PFT Study With Bronchodilator    Carbon Monoxide Diffusing Capacity       Plan:   46year old female is here for hospital f/u. She was admitted with bronchopneumonia and excessive wheezing with hypoxia requiring O2 supplement. She was treated with ABX, steroids and breathing treatments. Hypoxia improved. Currently on room air  She is a smoker. Smokes 1PPD.   Clinical picture was

## 2021-09-14 ENCOUNTER — OFFICE VISIT (OUTPATIENT)
Dept: PULMONOLOGY | Age: 51
End: 2021-09-14
Payer: COMMERCIAL

## 2021-09-14 VITALS
DIASTOLIC BLOOD PRESSURE: 74 MMHG | WEIGHT: 206 LBS | HEART RATE: 106 BPM | HEIGHT: 66 IN | TEMPERATURE: 96.6 F | SYSTOLIC BLOOD PRESSURE: 112 MMHG | BODY MASS INDEX: 33.11 KG/M2 | OXYGEN SATURATION: 96 %

## 2021-09-14 DIAGNOSIS — J44.9 SUSPECTED CHRONIC OBSTRUCTIVE PULMONARY DISEASE BASED ON INITIAL EVALUATION (HCC): Primary | ICD-10-CM

## 2021-09-14 PROBLEM — J96.01 ACUTE RESPIRATORY FAILURE WITH HYPOXIA (HCC): Status: RESOLVED | Noted: 2021-05-22 | Resolved: 2021-09-14

## 2021-09-14 PROCEDURE — 99213 OFFICE O/P EST LOW 20 MIN: CPT | Performed by: INTERNAL MEDICINE

## 2021-09-14 ASSESSMENT — ENCOUNTER SYMPTOMS
WHEEZING: 0
CHEST TIGHTNESS: 0
SHORTNESS OF BREATH: 1
COUGH: 0

## 2021-09-14 NOTE — PROGRESS NOTES
[facility]    Outpatient Note    Subjective:   CHIEF COMPLAINT: No chief complaint on file. Interval history on 9/14/21  Breathing is OK except at work. There is no cough. Very rarely use the rescue inhaler. She is not consistent with symbicort. Still smoking. 6-10 cigarettes a day. She was under the impression coming today to do PFT     HPI:  6/29/21   The patient is 46 y.o. female who presents today for a hospital follow up. She was admitted to the hospital on 5/22/21 with bronchopneumonia, wheezing and hypoxia. Clinically findings were consistent with COPD exacerbation. She was treated with ABX, steroids, breathing treatment and O2. She was discharged on 5/27/21    She is back to work now, however she has difficulty wearing a mask due to the heat, as it makes her SOB worse. Apart from work her breathing is fine. Occasional cough, nothing coming up. She is currently on symbicort which she use on PRN bases and albuterol. She is still smoking. She is asking to fill [employer] paperwork. Past Medical History:    History reviewed. No pertinent past medical history. Social History:    Social History     Tobacco Use   Smoking Status Current Every Day Smoker    Packs/day: 0.50   Smokeless Tobacco Never Used       Family History:  History reviewed. No pertinent family history.     Current Medications:  Current Outpatient Medications on File Prior to Visit   Medication Sig Dispense Refill    budesonide-formoterol (SYMBICORT) 160-4.5 MCG/ACT AERO Inhale 2 puffs into the lungs 2 times daily 1 Inhaler 5    albuterol sulfate HFA (VENTOLIN HFA) 108 (90 Base) MCG/ACT inhaler Inhale 2 puffs into the lungs every 4 hours as needed for Wheezing or Shortness of Breath 1 Inhaler 3    ipratropium-albuterol (DUONEB) 0.5-2.5 (3) MG/3ML SOLN nebulizer solution Take 3 mLs by nebulization every 4 hours as needed for Shortness of Breath 360 mL 0     No current facility-administered medications on file prior to visit. REVIEW OF SYSTEMS:    Review of Systems   Constitutional: Negative for chills, fatigue, fever and unexpected weight change. HENT: Negative for congestion. Respiratory: Positive for shortness of breath (with hot enviroments ). Negative for cough, chest tightness and wheezing. Cardiovascular: Negative for chest pain, palpitations and leg swelling. Neurological: Negative for weakness. Psychiatric/Behavioral: The patient is not nervous/anxious. All other systems reviewed and are negative. Objective:   PHYSICAL EXAM:        VITALS:  /74 (Site: Left Upper Arm, Position: Sitting, Cuff Size: Medium Adult)   Pulse 106   Temp 96.6 °F (35.9 °C) (Infrared)   Ht 5' 6\" (1.676 m)   Wt 206 lb (93.4 kg)   SpO2 96%   Breastfeeding No   BMI 33.25 kg/m²     Physical Exam  Vitals reviewed. Constitutional:       Appearance: She is well-developed. HENT:      Head: Normocephalic and atraumatic. Eyes:      Pupils: Pupils are equal, round, and reactive to light. Neck:      Vascular: No JVD. Cardiovascular:      Rate and Rhythm: Normal rate and regular rhythm. Heart sounds: No murmur heard. Pulmonary:      Effort: Pulmonary effort is normal. No respiratory distress. Breath sounds: Normal breath sounds. No stridor. No wheezing or rales. Abdominal:      General: Bowel sounds are normal.      Palpations: Abdomen is soft. Musculoskeletal:         General: No deformity. Cervical back: Neck supple. Right lower leg: No edema. Left lower leg: No edema. Skin:     General: Skin is warm and dry. Neurological:      Mental Status: She is alert and oriented to person, place, and time. Psychiatric:         Behavior: Behavior normal.         DATA:    CT chest on 5/22/21      1. No evidence of pulmonary thromboembolism. No acute vascular abnormality.    2. Patchy areas of branching micronodular opacities bilaterally involving all lobes suggesting bronchiolitis versus early bilateral multifocal bronchopneumonia. Assessment:      Diagnosis Orders   1. Suspected chronic obstructive pulmonary disease based on initial evaluation [facility])         Plan:   46year old female is here for regular f/u. She was admitted on 5/22/21 with bronchopneumonia and excessive wheezing with hypoxia requiring O2 supplement. She was treated with ABX, steroids and breathing treatments. Hypoxia improved. Clinical picture was consistent with COPD exacerbation. She had similar episode but to a less degree two years ago. There is no prior PFT. She is a smoker. She is down to 1/2PPD. She is not consistent with symbicort and rarely requiring albuterol at this time. Counseled to quit smoking   Get PFT with pre and post bronchodilator and DLCO  Continue symbicort and albuterol. She had covid few months ago. She was advised to take the vaccine. She has some concerns. Tucker Schuster   Spent over 25 minutes on this visit including history, physical exam, review of data, development and implementation of treatment plan

## 2025-08-25 ENCOUNTER — OFFICE VISIT (OUTPATIENT)
Age: 55
End: 2025-08-25

## 2025-08-25 VITALS
TEMPERATURE: 98.2 F | HEART RATE: 88 BPM | RESPIRATION RATE: 18 BRPM | OXYGEN SATURATION: 93 % | DIASTOLIC BLOOD PRESSURE: 94 MMHG | WEIGHT: 181 LBS | HEIGHT: 66 IN | BODY MASS INDEX: 29.09 KG/M2 | SYSTOLIC BLOOD PRESSURE: 150 MMHG

## 2025-08-25 DIAGNOSIS — M79.672 LEFT FOOT PAIN: Primary | ICD-10-CM

## 2025-08-25 DIAGNOSIS — I10 ELEVATED BLOOD PRESSURE READING IN OFFICE WITH DIAGNOSIS OF HYPERTENSION: ICD-10-CM

## 2025-08-26 ENCOUNTER — OFFICE VISIT (OUTPATIENT)
Dept: ORTHOPEDIC SURGERY | Age: 55
End: 2025-08-26
Payer: COMMERCIAL

## 2025-08-26 VITALS — BODY MASS INDEX: 29.09 KG/M2 | RESPIRATION RATE: 12 BRPM | HEIGHT: 66 IN | WEIGHT: 181 LBS

## 2025-08-26 DIAGNOSIS — S92.355A CLOSED NONDISPLACED FRACTURE OF FIFTH METATARSAL BONE OF LEFT FOOT, INITIAL ENCOUNTER: Primary | ICD-10-CM

## 2025-08-26 PROCEDURE — 99203 OFFICE O/P NEW LOW 30 MIN: CPT

## 2025-08-26 ASSESSMENT — ENCOUNTER SYMPTOMS
NAUSEA: 0
CONSTIPATION: 0
SHORTNESS OF BREATH: 0
COUGH: 0
ABDOMINAL PAIN: 0
DIARRHEA: 0
VOMITING: 0
CHEST TIGHTNESS: 0